# Patient Record
Sex: FEMALE | Race: WHITE | NOT HISPANIC OR LATINO | Employment: OTHER | URBAN - METROPOLITAN AREA
[De-identification: names, ages, dates, MRNs, and addresses within clinical notes are randomized per-mention and may not be internally consistent; named-entity substitution may affect disease eponyms.]

---

## 2017-01-03 ENCOUNTER — OFFICE VISIT (OUTPATIENT)
Dept: INTERNAL MEDICINE | Facility: CLINIC | Age: 82
End: 2017-01-03
Payer: MEDICARE

## 2017-01-03 VITALS
SYSTOLIC BLOOD PRESSURE: 142 MMHG | TEMPERATURE: 99 F | BODY MASS INDEX: 23.91 KG/M2 | HEART RATE: 74 BPM | DIASTOLIC BLOOD PRESSURE: 80 MMHG | HEIGHT: 63 IN | WEIGHT: 134.94 LBS

## 2017-01-03 DIAGNOSIS — N64.59 INVERTED NIPPLE: Chronic | ICD-10-CM

## 2017-01-03 DIAGNOSIS — Z79.890 POSTMENOPAUSAL HORMONE REPLACEMENT THERAPY: ICD-10-CM

## 2017-01-03 DIAGNOSIS — G47.9 SLEEP DISTURBANCE: ICD-10-CM

## 2017-01-03 DIAGNOSIS — M81.0 OSTEOPOROSIS: ICD-10-CM

## 2017-01-03 DIAGNOSIS — N64.4 BREAST TENDERNESS: Primary | ICD-10-CM

## 2017-01-03 DIAGNOSIS — Z12.31 ENCOUNTER FOR SCREENING MAMMOGRAM FOR BREAST CANCER: ICD-10-CM

## 2017-01-03 PROCEDURE — 99999 PR PBB SHADOW E&M-EST. PATIENT-LVL III: CPT | Mod: PBBFAC,,, | Performed by: FAMILY MEDICINE

## 2017-01-03 PROCEDURE — 99499 UNLISTED E&M SERVICE: CPT | Mod: S$GLB,,, | Performed by: FAMILY MEDICINE

## 2017-01-03 PROCEDURE — 1160F RVW MEDS BY RX/DR IN RCRD: CPT | Mod: S$GLB,,, | Performed by: FAMILY MEDICINE

## 2017-01-03 PROCEDURE — 1126F AMNT PAIN NOTED NONE PRSNT: CPT | Mod: S$GLB,,, | Performed by: FAMILY MEDICINE

## 2017-01-03 PROCEDURE — 1157F ADVNC CARE PLAN IN RCRD: CPT | Mod: S$GLB,,, | Performed by: FAMILY MEDICINE

## 2017-01-03 PROCEDURE — 99214 OFFICE O/P EST MOD 30 MIN: CPT | Mod: S$GLB,,, | Performed by: FAMILY MEDICINE

## 2017-01-03 PROCEDURE — 3079F DIAST BP 80-89 MM HG: CPT | Mod: S$GLB,,, | Performed by: FAMILY MEDICINE

## 2017-01-03 PROCEDURE — 1159F MED LIST DOCD IN RCRD: CPT | Mod: S$GLB,,, | Performed by: FAMILY MEDICINE

## 2017-01-03 PROCEDURE — 3077F SYST BP >= 140 MM HG: CPT | Mod: S$GLB,,, | Performed by: FAMILY MEDICINE

## 2017-01-03 NOTE — PROGRESS NOTES
Subjective:      Patient ID: Soren Gasca is a 83 y.o. female.    Chief Complaint: Breast Pain and sleep issues    HPI Comments: Patient is coming in today by herself  for  A few issues.     First she wants to see about breast tenderness.  She states is been going on for about a week.  It's sore just to the touch.  She has a known history of a right inverted nipple.  She denies any change in her diet.  She denies any coughing excessively.  She takes 2 extra strength Tylenol twice a day regularly.  She is not really due for mammogram for the two-year ti until October 2017 however she is on estradiol 0.5 mg daily.  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    She does report occasionally just not sleeping well.  She reports that she'll wake up around 2 or 3 AM and sometimes can go back to sleep.  She is uncertain what this may be related to.  She is currently on Aricept at night.  She takes Xanax at nighttime at 0.25 every night.  She states she hasn't missed any doses.  She does report that she's overly anxious at good bit lately.  She doesn't like getting older at this time.    She is needing a bone density.  She's willing to this the same time she does her mammogram.    She does still report some sinus congestion as well.        Lab Results   Component Value Date    WBC 8.77 11/02/2016    HGB 12.1 11/02/2016    HCT 37.1 11/02/2016     11/02/2016    CHOL 221 (H) 02/16/2016    TRIG 179 (H) 02/16/2016    HDL 65 02/16/2016    ALT 14 11/02/2016    AST 27 11/02/2016     11/02/2016    K 5.1 11/02/2016     11/02/2016    CREATININE 0.8 11/02/2016    BUN 18 11/02/2016    CO2 29 11/02/2016    TSH 1.086 11/02/2016    INR 1.0 12/03/2009    HGBA1C 5.9 11/02/2016       Review of Systems   Constitutional: Negative for activity change, appetite change, fatigue and unexpected weight change.    Respiratory: Negative for cough and shortness of breath.    Cardiovascular: Negative for chest pain and palpitations.   Gastrointestinal: Negative for abdominal distention and abdominal pain.   Neurological: Negative for weakness and light-headedness.   Psychiatric/Behavioral: Positive for confusion and sleep disturbance. The patient is nervous/anxious.      Objective:     Physical Exam   Constitutional: She appears well-developed and well-nourished.   HENT:   Head: Normocephalic and atraumatic.   Right Ear: Tympanic membrane normal.   Left Ear: Tympanic membrane normal.   Mouth/Throat: Oropharynx is clear and moist.   Eyes: Conjunctivae and EOM are normal.   Neck: Normal range of motion. Neck supple.   Cardiovascular: Normal rate and regular rhythm.    Pulmonary/Chest: Effort normal and breath sounds normal. Right breast exhibits inverted nipple and tenderness. Right breast exhibits no mass, no nipple discharge and no skin change. Left breast exhibits tenderness. Left breast exhibits no inverted nipple, no mass, no nipple discharge and no skin change.       Psychiatric: She has a normal mood and affect. Her behavior is normal.   Nursing note and vitals reviewed.    Assessment:     1. Breast tenderness    2. Encounter for screening mammogram for breast cancer    3. Inverted nipple    4. Postmenopausal hormone replacement therapy    5. Osteoporosis    6. Sleep disturbance      Plan:   Soren was seen today for breast pain and sleep issues.    Diagnoses and all orders for this visit:    Breast tenderness  Comments:  bilateral, no masses palpated on estrogen, schedule for regular mammogram.   Orders:  -     Mammo Digital Screening Bilat with CAD; Future    Encounter for screening mammogram for breast cancer  -     Mammo Digital Screening Bilat with CAD; Future    Inverted nipple  Comments:  chronic on righ breast.   Orders:  -     Mammo Digital Screening Bilat with CAD; Future    Postmenopausal hormone replacement  therapy  Comments:  doesn't want to stop meds currently. on estradiol. denies taking additional doses.   Orders:  -     Mammo Digital Screening Bilat with CAD; Future    Osteoporosis  Comments:  needing dexa will schedule on same day as mammo  Orders:  -     DXA Bone Density Spine And Hip_Axial Skeleton; Future    Sleep disturbance  Comments:  has xanax using nightly. some concerns may be related to aricept as well.                Return if symptoms worsen or fail to improve.

## 2017-01-03 NOTE — MR AVS SNAPSHOT
[facility]  [address] [place] [address]  Phone: [phone]  Fax: [phone]                  Soren Gasca   1/3/2017 9:00 AM   Office Visit    Description:  Female : 1933   Provider:  Monica Jones MD   Department[facility]  [facility]           Reason for Visit     Breast Pain           Diagnoses this Visit        Comments    Breast tenderness    -  Primary bilateral, no masses palpated on estrogen, schedule for regular mammogram.     Encounter for screening mammogram for breast cancer         Inverted nipple     chronic on left breast.     Postmenopausal hormone replacement therapy     doesn't want to stop meds currently. on estradiol. denies taking additional doses.     Osteoporosis     needing dexa will schedule on same day as mammo    Sleep disturbance     has xanax using nightly.            To Do List           Future Appointments        Provider Department Dept Phone    2017 2:30 PM [facility] MAMMO1-SCR [facility] [phone]    2017 3:00 PM [facility] BMD1 [facility] [phone]    2017 1:00 PM HRA, YESIKA [facility] [phone]    5/3/2017 10:00 AM Monica Jones MD [facility] [phone]      Goals (5 Years of Data)     None      Follow-Up and Disposition     Return if symptoms worsen or fail to improve.      [facility]     [facility] Nurse Care Line -  Assistance  Registered nurses in the [facility] provide clinical advisement, health education, appointment booking, and other advisory services.  Call for this free service at [phone].             Medications           Message regarding Medications     Verify the changes and/or additions to your medication regime listed below are the same as discussed with your clinician today.  If any of these changes or additions are incorrect, please notify your healthcare provider.    "          Verify that the below list of medications is an accurate representation of the medications you are currently taking.  If none reported, the list may be blank. If incorrect, please contact your healthcare provider. Carry this list with you in case of emergency.           Current Medications     acetaminophen (TYLENOL ARTHRITIS PAIN) 650 MG TbSR Take 650 mg by mouth every 8 (eight) hours.    alprazolam (XANAX) 0.25 MG tablet Take 1 tablet (0.25 mg total) by mouth nightly as needed.    cyanocobalamin (VITAMIN B-12) 500 MCG tablet Take 500 mcg by mouth once daily.    donepezil (ARICEPT) 10 MG tablet Take 1 tablet (10 mg total) by mouth every evening.    doxycycline (MONODOX) 50 MG Cap Take once daily with food    estradiol (ESTRACE) 0.5 MG tablet Take 1 tablet (0.5 mg total) by mouth once daily.    furosemide (LASIX) 20 MG tablet TAKE ONE TABLET BY MOUTH ONE TIME DAILY    glucosamine-chondroitin 500-400 mg tablet Take 1 tablet by mouth 3 (three) times daily.    levocetirizine (XYZAL) 5 MG tablet Take 1 tablet each morning.    loratadine (CLARITIN) 10 mg tablet Take 10 mg by mouth once daily.    meclizine (ANTIVERT) 25 mg tablet Take 1 tablet (25 mg total) by mouth 3 (three) times daily as needed.    metoprolol tartrate (LOPRESSOR) 25 MG tablet Take 1 tablet (25 mg total) by mouth 2 (two) times daily.    pantoprazole (PROTONIX) 40 MG tablet TAKE ONE TABLET BY MOUTH ONE TIME DAILY    triamcinolone acetonide 0.1% (KENALOG) 0.1 % cream Apply topically 2 (two) times daily.    vitamin D 1000 units Tab Take 185 mg by mouth once daily.           Clinical Reference Information           Vital Signs - Last Recorded  Most recent update: 1/3/2017  9:12 AM by Morgan Albert MA    BP Pulse Temp Ht Wt BMI    (!) 142/80 74 98.5 °F (36.9 °C) 5' 3" (1.6 m) 61.2 kg (134 lb 14.7 oz) 23.9 kg/m2      Blood Pressure          Most Recent Value    BP  (!)  142/80      Allergies as of 1/3/2017     Amlodipine    Aspirin    Bactrim  " [Sulfamethoxazole-trimethoprim]    Sulfa (Sulfonamide Antibiotics)    Trimethoprim      Immunizations Administered on Date of Encounter - 1/3/2017     None      Orders Placed During Today's Visit     Future Labs/Procedures Expected by Expires    DXA Bone Density Spine And Hip_Axial Skeleton  1/3/2017 4/3/2017    Mammo Digital Screening Bilat with CAD  1/3/2017 3/6/2018      [facility] Sign-Up     Activating your [facility] account is as easy as 1-2-3!     1) Visit [email or web address], select Sign Up Now, enter this activation code and your date of birth, then select Next.  [email or web address]  Expires: 2/17/2017  9:29 AM      2) Create a username and password to use when you visit [facility] in the future and select a security question in case you lose your password and select Next.    3) Enter your e-mail address and click Sign Up!    Additional Information  If you have questions, please e-mail [email or web address] or call [phone] to talk to our [facility] staff. Remember, [facility] is NOT to be used for urgent needs. For medical emergencies, dial 911.

## 2017-01-04 ENCOUNTER — HOSPITAL ENCOUNTER (OUTPATIENT)
Dept: RADIOLOGY | Facility: HOSPITAL | Age: 82
Discharge: HOME | End: 2017-01-04
Attending: FAMILY MEDICINE
Payer: MEDICARE

## 2017-01-04 DIAGNOSIS — Z12.31 ENCOUNTER FOR SCREENING MAMMOGRAM FOR BREAST CANCER: ICD-10-CM

## 2017-01-04 DIAGNOSIS — N64.4 BREAST TENDERNESS: ICD-10-CM

## 2017-01-04 DIAGNOSIS — N64.59 INVERTED NIPPLE: Chronic | ICD-10-CM

## 2017-01-04 DIAGNOSIS — Z79.890 POSTMENOPAUSAL HORMONE REPLACEMENT THERAPY: ICD-10-CM

## 2017-01-04 PROCEDURE — 77066 DX MAMMO INCL CAD BI: CPT | Mod: 26,,, | Performed by: RADIOLOGY

## 2017-01-04 PROCEDURE — 77062 BREAST TOMOSYNTHESIS BI: CPT | Mod: 26,,, | Performed by: RADIOLOGY

## 2017-01-04 PROCEDURE — 77062 BREAST TOMOSYNTHESIS BI: CPT | Mod: TC

## 2017-01-05 ENCOUNTER — DOCUMENTATION ONLY (OUTPATIENT)
Dept: RADIOLOGY | Facility: HOSPITAL | Age: 82
End: 2017-01-05

## 2017-01-05 ENCOUNTER — TELEPHONE (OUTPATIENT)
Dept: INTERNAL MEDICINE | Facility: CLINIC | Age: 82
End: 2017-01-05

## 2017-01-05 NOTE — PROGRESS NOTES
Breast Care Management Follow-Up:    Date of Mammogram:01/04/17    Mammogram Reason:Pain in the right breast    Mammogram Results:No abnormality seen.      Referrals/Recommendations:If there is a persistent palpable abnormality, an u/s is rec. Annual mammo rec.        Patient Status:01/05/17 Results and rec given to pt by Dr. Jones's office. Results letter and report mailed to pt.

## 2017-01-05 NOTE — TELEPHONE ENCOUNTER
----- Message from Seema Patelite sent at 1/5/2017  9:43 AM CST -----  Contact: Pt   Pt called and stated she was returning a call. She can be reached at [phone] ([email or web address]).    Thanks,  TF

## 2017-01-15 ENCOUNTER — TELEPHONE (OUTPATIENT)
Dept: INTERNAL MEDICINE | Facility: CLINIC | Age: 82
End: 2017-01-15

## 2017-01-15 RX ORDER — ALPRAZOLAM 0.25 MG/1
0.25 TABLET ORAL NIGHTLY PRN
Qty: 30 TABLET | Refills: 1 | Status: SHIPPED | OUTPATIENT
Start: 2017-01-15 | End: 2017-03-27 | Stop reason: SDUPTHER

## 2017-01-15 NOTE — TELEPHONE ENCOUNTER
Bone density shows osteopenia. Continue to obtain 1200mg of calcium through diet, along with about 1000 IU of vitamin D a day. Repeat again in 3-5 years bone density study.

## 2017-01-24 RX ORDER — METOPROLOL TARTRATE 25 MG/1
25 TABLET, FILM COATED ORAL 2 TIMES DAILY
Qty: 180 TABLET | Refills: 3 | Status: SHIPPED | OUTPATIENT
Start: 2017-01-24 | End: 2017-12-11 | Stop reason: SDUPTHER

## 2017-01-24 NOTE — TELEPHONE ENCOUNTER
----- Message from Heide Mcneal sent at 1/24/2017  3:48 PM CST -----  Contact: Librado with [facility] [employer]  [phone]  Pt needs auth for a 90 day supply on Metoprolol

## 2017-03-27 RX ORDER — ALPRAZOLAM 0.25 MG/1
0.25 TABLET ORAL NIGHTLY PRN
Qty: 30 TABLET | Refills: 1 | Status: SHIPPED | OUTPATIENT
Start: 2017-03-27 | End: 2017-07-03 | Stop reason: SDUPTHER

## 2017-03-29 ENCOUNTER — TELEPHONE (OUTPATIENT)
Dept: INTERNAL MEDICINE | Facility: CLINIC | Age: 82
End: 2017-03-29

## 2017-03-29 NOTE — TELEPHONE ENCOUNTER
----- Message from Seema Yepez sent at 3/29/2017 10:37 AM CDT -----  Contact: Pt   Pt called and stated she needed to speak to the nurse. She stated she needs to know if her alprazolam medication has been called in to the pharmacy.  She can be reached at [phone].    Thanks,  TF

## 2017-04-13 RX ORDER — MECLIZINE HYDROCHLORIDE 25 MG/1
25 TABLET ORAL 3 TIMES DAILY PRN
Qty: 90 TABLET | Refills: 3 | Status: SHIPPED | OUTPATIENT
Start: 2017-04-13 | End: 2017-12-11 | Stop reason: SDUPTHER

## 2017-04-19 ENCOUNTER — PATIENT OUTREACH (OUTPATIENT)
Dept: ADMINISTRATIVE | Facility: HOSPITAL | Age: 82
End: 2017-04-19

## 2017-04-19 DIAGNOSIS — I10 ESSENTIAL HYPERTENSION: Primary | Chronic | ICD-10-CM

## 2017-04-19 NOTE — PROGRESS NOTES
CHART AUDIT COMPLETED. LABS ENTERED PER WOG. LETTER MAILED TO INFORM PATIENT OF OVERDUE HEALTH MAINTENANCE.

## 2017-04-19 NOTE — LETTER
April 19, 2017    Soren Gasca  [address]  [place] [address]             [facility]  [address]  [facility] [address]  Phone: [phone] Dear Mrs. Gasca:        [facility] is committed to your overall health.  To help you get the most out of each of your visits, we will review your information to make sure you are up to date on all of your recommended tests and/or procedures.      Monica Jones MD has found that you may be due for   Health Maintenance Due   Topic    Lipid Panel         If you have had any of the above done at another facility, please bring the records or information with you so that your record at [facility] will be complete.    If you are currently taking medication, please bring it with you to your appointment for review.    We will be happy to assist you with scheduling any necessary appointments or you may contact the [facility] appointment desk at [phone] to schedule at your convenience.     Thank you for choosing [facility] for your healthcare needs,    Tammy WADE LPN  Care Coordination Department  [facility]  [phone]

## 2017-05-02 ENCOUNTER — LAB VISIT (OUTPATIENT)
Dept: LAB | Facility: HOSPITAL | Age: 82
End: 2017-05-02
Attending: FAMILY MEDICINE
Payer: MEDICARE

## 2017-05-02 ENCOUNTER — OFFICE VISIT (OUTPATIENT)
Dept: INTERNAL MEDICINE | Facility: CLINIC | Age: 82
End: 2017-05-02
Payer: MEDICARE

## 2017-05-02 VITALS
WEIGHT: 133.19 LBS | HEIGHT: 63 IN | BODY MASS INDEX: 23.6 KG/M2 | DIASTOLIC BLOOD PRESSURE: 60 MMHG | HEART RATE: 80 BPM | SYSTOLIC BLOOD PRESSURE: 120 MMHG | TEMPERATURE: 98 F

## 2017-05-02 DIAGNOSIS — E55.9 VITAMIN D DEFICIENCY DISEASE: ICD-10-CM

## 2017-05-02 DIAGNOSIS — G31.84 MILD COGNITIVE IMPAIRMENT WITH MEMORY LOSS: Chronic | ICD-10-CM

## 2017-05-02 DIAGNOSIS — I10 ESSENTIAL HYPERTENSION: Chronic | ICD-10-CM

## 2017-05-02 DIAGNOSIS — I70.0 CALCIFICATION OF AORTA: Chronic | ICD-10-CM

## 2017-05-02 DIAGNOSIS — R73.9 HYPERGLYCEMIA: ICD-10-CM

## 2017-05-02 DIAGNOSIS — E78.5 HYPERLIPIDEMIA, UNSPECIFIED HYPERLIPIDEMIA TYPE: Chronic | ICD-10-CM

## 2017-05-02 DIAGNOSIS — Z00.00 ROUTINE GENERAL MEDICAL EXAMINATION AT A HEALTH CARE FACILITY: ICD-10-CM

## 2017-05-02 DIAGNOSIS — D69.6 THROMBOCYTOPENIA: Chronic | ICD-10-CM

## 2017-05-02 DIAGNOSIS — Z90.81 H/O SPLENECTOMY: Chronic | ICD-10-CM

## 2017-05-02 DIAGNOSIS — Z00.00 ROUTINE GENERAL MEDICAL EXAMINATION AT A HEALTH CARE FACILITY: Primary | ICD-10-CM

## 2017-05-02 LAB
25(OH)D3+25(OH)D2 SERPL-MCNC: 89 NG/ML
ALBUMIN SERPL BCP-MCNC: 3.7 G/DL
ALP SERPL-CCNC: 57 U/L
ALT SERPL W/O P-5'-P-CCNC: 9 U/L
ANION GAP SERPL CALC-SCNC: 12 MMOL/L
AST SERPL-CCNC: 17 U/L
BASOPHILS # BLD AUTO: 0.03 K/UL
BASOPHILS NFR BLD: 0.4 %
BILIRUB SERPL-MCNC: 0.3 MG/DL
BUN SERPL-MCNC: 21 MG/DL
CALCIUM SERPL-MCNC: 9.3 MG/DL
CHLORIDE SERPL-SCNC: 104 MMOL/L
CHOLEST/HDLC SERPL: 3.6 {RATIO}
CO2 SERPL-SCNC: 24 MMOL/L
CREAT SERPL-MCNC: 0.8 MG/DL
DIFFERENTIAL METHOD: ABNORMAL
EOSINOPHIL # BLD AUTO: 0.1 K/UL
EOSINOPHIL NFR BLD: 1.1 %
ERYTHROCYTE [DISTWIDTH] IN BLOOD BY AUTOMATED COUNT: 15.1 %
EST. GFR  (AFRICAN AMERICAN): >60 ML/MIN/1.73 M^2
EST. GFR  (NON AFRICAN AMERICAN): >60 ML/MIN/1.73 M^2
GLUCOSE SERPL-MCNC: 107 MG/DL
HCT VFR BLD AUTO: 34.5 %
HDL/CHOLESTEROL RATIO: 27.6 %
HDLC SERPL-MCNC: 203 MG/DL
HDLC SERPL-MCNC: 56 MG/DL
HGB BLD-MCNC: 11.6 G/DL
LDLC SERPL CALC-MCNC: 102.2 MG/DL
LYMPHOCYTES # BLD AUTO: 4 K/UL
LYMPHOCYTES NFR BLD: 48.9 %
MCH RBC QN AUTO: 31.7 PG
MCHC RBC AUTO-ENTMCNC: 33.6 %
MCV RBC AUTO: 94 FL
MONOCYTES # BLD AUTO: 0.6 K/UL
MONOCYTES NFR BLD: 7.6 %
NEUTROPHILS # BLD AUTO: 3.4 K/UL
NEUTROPHILS NFR BLD: 41.9 %
NONHDLC SERPL-MCNC: 147 MG/DL
PLATELET # BLD AUTO: 295 K/UL
PMV BLD AUTO: 11.1 FL
POTASSIUM SERPL-SCNC: 3.7 MMOL/L
PROT SERPL-MCNC: 7.3 G/DL
RBC # BLD AUTO: 3.66 M/UL
SODIUM SERPL-SCNC: 140 MMOL/L
T4 FREE SERPL-MCNC: 0.91 NG/DL
TRIGL SERPL-MCNC: 224 MG/DL
TSH SERPL DL<=0.005 MIU/L-ACNC: 1.49 UIU/ML
WBC # BLD AUTO: 8.18 K/UL

## 2017-05-02 PROCEDURE — 84443 ASSAY THYROID STIM HORMONE: CPT

## 2017-05-02 PROCEDURE — 99499 UNLISTED E&M SERVICE: CPT | Mod: ,,, | Performed by: FAMILY MEDICINE

## 2017-05-02 PROCEDURE — 84439 ASSAY OF FREE THYROXINE: CPT

## 2017-05-02 PROCEDURE — 99999 PR PBB SHADOW E&M-EST. PATIENT-LVL III: CPT | Mod: PBBFAC,,, | Performed by: FAMILY MEDICINE

## 2017-05-02 PROCEDURE — 85025 COMPLETE CBC W/AUTO DIFF WBC: CPT

## 2017-05-02 PROCEDURE — 3074F SYST BP LT 130 MM HG: CPT | Mod: S$GLB,,, | Performed by: FAMILY MEDICINE

## 2017-05-02 PROCEDURE — 36415 COLL VENOUS BLD VENIPUNCTURE: CPT | Mod: PO

## 2017-05-02 PROCEDURE — 80061 LIPID PANEL: CPT

## 2017-05-02 PROCEDURE — 3078F DIAST BP <80 MM HG: CPT | Mod: S$GLB,,, | Performed by: FAMILY MEDICINE

## 2017-05-02 PROCEDURE — 83036 HEMOGLOBIN GLYCOSYLATED A1C: CPT

## 2017-05-02 PROCEDURE — 82306 VITAMIN D 25 HYDROXY: CPT

## 2017-05-02 PROCEDURE — 80053 COMPREHEN METABOLIC PANEL: CPT

## 2017-05-02 PROCEDURE — 99499 UNLISTED E&M SERVICE: CPT | Mod: S$GLB,,, | Performed by: FAMILY MEDICINE

## 2017-05-02 PROCEDURE — 99397 PER PM REEVAL EST PAT 65+ YR: CPT | Mod: S$GLB,,, | Performed by: FAMILY MEDICINE

## 2017-05-02 NOTE — PROGRESS NOTES
Subjective:      Patient ID: Soren Gasca is a 83 y.o. female.    Chief Complaint: Follow-up (6m) and Annual Exam    HPI Comments: Patient is coming in today with her daughter  for followup of multiple issues and prevention exam    She reports now that her energy levels are good and she's doing well.  She still has complaints of postnasal drip ears itching and nasal lesions with some chest congestion off and on but otherwise doing well.  She seems to complain about this each and every time.  She's tried Claritin and Zyrtec and over-the-counter medicines.  She has prescribed to her leave a search was seen but she doesn't report taking it.  Her graft she denies any fatigue at this time.  She does have mild cognitive decline for which she's on Aricept.  She saw neurology last April and is due again for repeat.  She is not had any balance or fall issues that they're aware of.  They stated time she does get off balance but hasn't fallen yet.  Her oldest granddaughter lives with her as well.      She does use Xanax to help her sleep at night. Stable at this time    Blood pressures very well controlled today no problems with her medication at this time.      She does have a history of hypothyroidism for which were needing to repeat some blood work today.  She also has a history of low platelets which will be repeated as well today.            Lab Results   Component Value Date    WBC 8.77 11/02/2016    HGB 12.1 11/02/2016    HCT 37.1 11/02/2016     11/02/2016    CHOL 221 (H) 02/16/2016    TRIG 179 (H) 02/16/2016    HDL 65 02/16/2016    ALT 14 11/02/2016    AST 27 11/02/2016     11/02/2016    K 5.1 11/02/2016     11/02/2016    CREATININE 0.8 11/02/2016    BUN 18 11/02/2016    CO2 29 11/02/2016    TSH 1.086 11/02/2016    INR 1.0 12/03/2009    HGBA1C 5.9 11/02/2016       Review of Systems   Constitutional: Negative for chills, fatigue and fever.   HENT: Positive for congestion and postnasal drip. Negative  for ear pain and trouble swallowing.    Eyes: Negative for pain and visual disturbance.   Respiratory: Negative for cough, shortness of breath and wheezing.    Cardiovascular: Negative for chest pain and leg swelling.   Gastrointestinal: Negative for abdominal pain, blood in stool, nausea and vomiting.   Endocrine: Negative for cold intolerance and heat intolerance.   Genitourinary: Negative for dysuria and frequency.   Musculoskeletal: Negative for joint swelling, myalgias and neck pain.   Skin: Negative for color change and rash.   Neurological: Negative for dizziness and headaches.   Psychiatric/Behavioral: Positive for confusion. Negative for behavioral problems and sleep disturbance.     Objective:     Physical Exam   Constitutional: She appears well-developed and well-nourished.   HENT:   Head: Normocephalic and atraumatic.   Right Ear: External ear normal.   Left Ear: External ear normal.   Mouth/Throat: Oropharynx is clear and moist.   Eyes: EOM are normal.   Neck: Normal range of motion. Neck supple. No thyromegaly present.   Cardiovascular: Normal rate and regular rhythm.  Exam reveals no gallop and no friction rub.    No murmur heard.  Pulmonary/Chest: Effort normal. No respiratory distress. She has no wheezes. She has no rales.   Abdominal: Soft. Bowel sounds are normal. She exhibits no distension. There is no tenderness. There is no rebound.   Musculoskeletal: Normal range of motion. She exhibits no edema.   Lymphadenopathy:     She has no cervical adenopathy.   Neurological: She is alert. No sensory deficit. Coordination and gait normal.   Skin: Skin is warm and dry. No rash noted.   Psychiatric: She has a normal mood and affect. Her speech is normal and behavior is normal. Judgment and thought content normal. Cognition and memory are impaired. She exhibits normal recent memory and normal remote memory.   Vitals reviewed.    Assessment:     1. Routine general medical examination at a [facility]  facility    2. Hyperlipidemia, unspecified hyperlipidemia type    3. Essential hypertension    4. H/O splenectomy    5. Calcification of aorta    6. Thrombocytopenia    7. Hyperglycemia    8. Vitamin D deficiency disease    9. Mild cognitive impairment with memory loss      Plan:   Soren was seen today for follow-up and annual exam.    Diagnoses and all orders for this visit:    Routine general medical examination at a [facility] facility- - labs ordered. Discussed Health Maintenance issues.     -     Lipid panel; Future  -     TSH; Future  -     T4, free; Future  -     Hemoglobin A1c; Future  -     Comprehensive metabolic panel; Future  -     CBC auto differential; Future  -     Vitamin D; Future    Hyperlipidemia, unspecified hyperlipidemia type - stable, Continue with current medications and interventions. - labs ordered.      -     Lipid panel; Future  -     TSH; Future  -     T4, free; Future  -     Hemoglobin A1c; Future  -     Comprehensive metabolic panel; Future  -     CBC auto differential; Future  -     Vitamin D; Future    Essential hypertension- stable, Continue with current medications and interventions. - labs ordered.      -     Lipid panel; Future  -     TSH; Future  -     T4, free; Future  -     Hemoglobin A1c; Future  -     Comprehensive metabolic panel; Future  -     CBC auto differential; Future  -     Vitamin D; Future    H/O splenectomy- stable, Continue with current medications and interventions. - labs ordered.      -     Lipid panel; Future  -     TSH; Future  -     T4, free; Future  -     Hemoglobin A1c; Future  -     Comprehensive metabolic panel; Future  -     CBC auto differential; Future  -     Vitamin D; Future    Calcification of aorta- stable, Continue with current medications and interventions. - labs ordered.      -     Lipid panel; Future  -     TSH; Future  -     T4, free; Future  -     Hemoglobin A1c; Future  -     Comprehensive metabolic panel; Future  -     CBC auto  differential; Future  -     Vitamin D; Future    Thrombocytopenia- stable, Continue with current medications and interventions. - labs ordered.      -     Lipid panel; Future  -     TSH; Future  -     T4, free; Future  -     Hemoglobin A1c; Future  -     Comprehensive metabolic panel; Future  -     CBC auto differential; Future  -     Vitamin D; Future    Hyperglycemia- stable, Continue with current medications and interventions. - labs ordered.  Last a1c noted to be prediabetic with a1c at 5.8    -     Lipid panel; Future  -     TSH; Future  -     T4, free; Future  -     Hemoglobin A1c; Future  -     Comprehensive metabolic panel; Future  -     CBC auto differential; Future  -     Vitamin D; Future    Vitamin D deficiency disease - stable, Continue with current medications and interventions. - labs ordered.      -     Lipid panel; Future  -     TSH; Future  -     T4, free; Future  -     Hemoglobin A1c; Future  -     Comprehensive metabolic panel; Future  -     CBC auto differential; Future  -     Vitamin D; Future    Mild cognitive impairment with memory loss- stable, Continue with current medications and interventions. Referral placed for yearly checkup     -     Ambulatory referral to Neurology            Return in about 6 months (around 11/2/2017) for chronic issues.

## 2017-05-02 NOTE — MR AVS SNAPSHOT
[facility]  [address] [place] [address]  Phone: [phone]  Fax: [phone]                  Soren Gasca   2017 2:00 PM   Office Visit    Description:  Female : 1933   Provider:  Monica Jones MD   [facility]           Reason for Visit     Follow-up     Annual Exam           Diagnoses this Visit        Comments    Routine general medical examination at a health care facility    -  Primary     Hyperlipidemia, unspecified hyperlipidemia type         Essential hypertension         H/O splenectomy         Calcification of aorta         Thrombocytopenia         Hyperglycemia         Vitamin D deficiency disease         Mild cognitive impairment with memory loss                To Do List           Future Appointments        Provider Department Dept Phone    2017 11:40 AM Bassem Powers MD [facility] - Neurology [phone]    2017 1:20 PM Monica Jones [facility] [phone]      Goals (5 Years of Data)     None      Follow-Up and Disposition     Return in about 6 months (around 2017) for chronic issues.      [facility]     [facility] Nurse Care Line -  Assistance  Unless otherwise directed by your provider, please contact [facility] On-Call, our nurse care line that is available for  assistance.     Registered nurses in the [facility] Center provide: appointment scheduling, clinical advisement, health education, and other advisory services.  Call: [phone] (toll free)               Medications           Message regarding Medications     Verify the changes and/or additions to your medication regime listed below are the same as discussed with your clinician today.  If any of these changes or additions are incorrect, please notify your healthcare provider.        STOP taking these medications     doxycycline (MONODOX) 50 MG Cap Take once daily with food     "loratadine (CLARITIN) 10 mg tablet Take 10 mg by mouth once daily.           Verify that the below list of medications is an accurate representation of the medications you are currently taking.  If none reported, the list may be blank. If incorrect, please contact your healthcare provider. Carry this list with you in case of emergency.           Current Medications     acetaminophen (TYLENOL ARTHRITIS PAIN) 650 MG TbSR Take 650 mg by mouth every 8 (eight) hours.    alprazolam (XANAX) 0.25 MG tablet Take 1 tablet (0.25 mg total) by mouth nightly as needed.    cyanocobalamin (VITAMIN B-12) 500 MCG tablet Take 500 mcg by mouth once daily.    donepezil (ARICEPT) 10 MG tablet Take 1 tablet (10 mg total) by mouth every evening.    estradiol (ESTRACE) 0.5 MG tablet Take 1 tablet (0.5 mg total) by mouth once daily.    furosemide (LASIX) 20 MG tablet TAKE ONE TABLET BY MOUTH ONE TIME DAILY    glucosamine-chondroitin 500-400 mg tablet Take 1 tablet by mouth 3 (three) times daily.    levocetirizine (XYZAL) 5 MG tablet Take 1 tablet each morning.    meclizine (ANTIVERT) 25 mg tablet Take 1 tablet (25 mg total) by mouth 3 (three) times daily as needed.    metoprolol tartrate (LOPRESSOR) 25 MG tablet Take 1 tablet (25 mg total) by mouth 2 (two) times daily.    pantoprazole (PROTONIX) 40 MG tablet TAKE ONE TABLET BY MOUTH ONE TIME DAILY    triamcinolone acetonide 0.1% (KENALOG) 0.1 % cream Apply topically 2 (two) times daily.    vitamin D 1000 units Tab Take 185 mg by mouth once daily.           Clinical Reference Information           Your Vitals Were     BP Pulse Temp Height Weight BMI    120/60 80 97.8 °F (36.6 °C) 5' 3" (1.6 m) 60.4 kg (133 lb 2.5 oz) 23.59 kg/m2      Blood Pressure          Most Recent Value    BP  120/60      Allergies as of 5/2/2017     Amlodipine    Aspirin    Bactrim  [Sulfamethoxazole-trimethoprim]    Sulfa (Sulfonamide Antibiotics)    Trimethoprim      Immunizations Administered on Date of Encounter - " 5/2/2017     None      Orders Placed During Today's Visit      Normal Orders This Visit    Ambulatory referral to Neurology     Future Labs/Procedures Expected by Expires    CBC auto differential  5/2/2017 7/1/2018    Comprehensive metabolic panel  5/2/2017 7/1/2018    Hemoglobin A1c  5/2/2017 7/1/2018    Lipid panel  5/2/2017 7/1/2018    T4, free  5/2/2017 7/1/2018    TSH  5/2/2017 7/1/2018    Vitamin D  5/2/2017 7/1/2018      [facility] Sign-Up     Activating your [facility] account is as easy as 1-2-3!     1) Visit [email or web address], select Sign Up Now, enter this activation code and your date of birth, then select Next.  [email or web address]  Expires: 6/16/2017  2:22 PM      2) Create a username and password to use when you visit [facility] in the future and select a security question in case you lose your password and select Next.    3) Enter your e-mail address and click Sign Up!    Additional Information  If you have questions, please e-mail [email or web address] or call [phone] to talk to our [facility] staff. Remember, [facility] is NOT to be used for urgent needs. For medical emergencies, dial 911.         Language Assistance Services     ATTENTION: Language assistance services are available, free of charge. Please call [phone].      ATENCIÓN: Si habla español, tiene a martínez disposición servicios gratuitos de asistencia lingüística. Llame al [phone].     [facility] Ý: N?u b?n nói Ti?ng Vi?t, có các d?ch v? h? tr? ngôn ng? mi?n phí dành cho b?n. G?i s? [phone].         [facility] complies with applicable Federal civil rights laws and does not discriminate on the basis of race, color, national origin, age, disability, or sex.

## 2017-05-03 ENCOUNTER — TELEPHONE (OUTPATIENT)
Dept: INTERNAL MEDICINE | Facility: CLINIC | Age: 82
End: 2017-05-03

## 2017-05-03 LAB
ESTIMATED AVG GLUCOSE: 120 MG/DL
HBA1C MFR BLD HPLC: 5.8 %

## 2017-05-03 NOTE — PROGRESS NOTES
Cholesterol, sugar levels, vitamin D and  kidney, liver functions, and thyroid functions within goal ranges. Mildly anemic. Please inform

## 2017-05-03 NOTE — TELEPHONE ENCOUNTER
----- Message from Deysi Burnham sent at 5/3/2017  2:15 PM CDT -----  Contact: patient  Returning your call. Please call patient @ [phone]. Thanks, lisa

## 2017-05-09 ENCOUNTER — TELEPHONE (OUTPATIENT)
Dept: INTERNAL MEDICINE | Facility: CLINIC | Age: 82
End: 2017-05-09

## 2017-05-09 RX ORDER — MUPIROCIN 20 MG/G
OINTMENT TOPICAL 3 TIMES DAILY
Qty: 30 G | Refills: 0 | Status: SHIPPED | OUTPATIENT
Start: 2017-05-09 | End: 2017-09-11 | Stop reason: SDUPTHER

## 2017-05-09 NOTE — TELEPHONE ENCOUNTER
"Pt states that she is still having issues with the "irriation in her nose". She stated that it is worse and very irritated now. She stated that you did see it when she was in the office the other day. She stated that it is very sore and then crusty like a scab. She does not know what else to do for it. It is very uncomfortable and she has run out of things to treat it with OTC. She is requesting that you send in something for this? She would like an abx?   "

## 2017-05-09 NOTE — TELEPHONE ENCOUNTER
----- Message from Ebony Mejia sent at 5/9/2017 10:28 AM CDT -----  Contact: pt   Pt states that she need some antibiotic cause her nose is irritated. ..[phone] (home)         ..  [facility] [address] [place] - [address] [facility]  [address] [place] [address]  Phone: [phone] Fax: [phone]

## 2017-05-11 ENCOUNTER — TELEPHONE (OUTPATIENT)
Dept: INTERNAL MEDICINE | Facility: CLINIC | Age: 82
End: 2017-05-11

## 2017-05-11 NOTE — TELEPHONE ENCOUNTER
----- Message from Yeyo Lerma sent at 5/11/2017 11:39 AM CDT -----  Contact: pt  She's calling in regards to a missed call, please advise, [phone] (home)

## 2017-06-14 RX ORDER — ESTRADIOL 0.5 MG/1
0.5 TABLET ORAL DAILY
Qty: 90 TABLET | Refills: 2 | Status: SHIPPED | OUTPATIENT
Start: 2017-06-14 | End: 2018-02-14 | Stop reason: SDUPTHER

## 2017-07-03 RX ORDER — ALPRAZOLAM 0.25 MG/1
0.25 TABLET ORAL NIGHTLY PRN
Qty: 30 TABLET | Refills: 1 | Status: SHIPPED | OUTPATIENT
Start: 2017-07-03 | End: 2017-09-28 | Stop reason: SDUPTHER

## 2017-08-11 RX ORDER — FUROSEMIDE 20 MG/1
TABLET ORAL
Qty: 90 TABLET | Refills: 2 | Status: SHIPPED | OUTPATIENT
Start: 2017-08-11 | End: 2018-02-14 | Stop reason: SDUPTHER

## 2017-08-11 RX ORDER — PANTOPRAZOLE SODIUM 40 MG/1
TABLET, DELAYED RELEASE ORAL
Qty: 90 TABLET | Refills: 2 | Status: SHIPPED | OUTPATIENT
Start: 2017-08-11 | End: 2017-12-11 | Stop reason: SDUPTHER

## 2017-08-13 DIAGNOSIS — L71.0 PERIORAL DERMATITIS: ICD-10-CM

## 2017-08-14 RX ORDER — LEVOCETIRIZINE DIHYDROCHLORIDE 5 MG/1
TABLET, FILM COATED ORAL
Qty: 90 TABLET | Refills: 1 | Status: SHIPPED | OUTPATIENT
Start: 2017-08-14 | End: 2017-12-11

## 2017-08-15 ENCOUNTER — OFFICE VISIT (OUTPATIENT)
Dept: NEUROLOGY | Facility: CLINIC | Age: 82
End: 2017-08-15
Payer: MEDICARE

## 2017-08-15 VITALS
DIASTOLIC BLOOD PRESSURE: 78 MMHG | HEART RATE: 70 BPM | SYSTOLIC BLOOD PRESSURE: 156 MMHG | WEIGHT: 131.63 LBS | HEIGHT: 63 IN | BODY MASS INDEX: 23.32 KG/M2

## 2017-08-15 DIAGNOSIS — G31.84 MILD COGNITIVE IMPAIRMENT WITH MEMORY LOSS: Primary | Chronic | ICD-10-CM

## 2017-08-15 PROCEDURE — 3078F DIAST BP <80 MM HG: CPT | Mod: S$GLB,,, | Performed by: PSYCHIATRY & NEUROLOGY

## 2017-08-15 PROCEDURE — 1159F MED LIST DOCD IN RCRD: CPT | Mod: S$GLB,,, | Performed by: PSYCHIATRY & NEUROLOGY

## 2017-08-15 PROCEDURE — 99999 PR PBB SHADOW E&M-EST. PATIENT-LVL III: CPT | Mod: PBBFAC,,, | Performed by: PSYCHIATRY & NEUROLOGY

## 2017-08-15 PROCEDURE — 1126F AMNT PAIN NOTED NONE PRSNT: CPT | Mod: S$GLB,,, | Performed by: PSYCHIATRY & NEUROLOGY

## 2017-08-15 PROCEDURE — 99213 OFFICE O/P EST LOW 20 MIN: CPT | Mod: S$GLB,,, | Performed by: PSYCHIATRY & NEUROLOGY

## 2017-08-15 PROCEDURE — 3077F SYST BP >= 140 MM HG: CPT | Mod: S$GLB,,, | Performed by: PSYCHIATRY & NEUROLOGY

## 2017-08-15 PROCEDURE — 3008F BODY MASS INDEX DOCD: CPT | Mod: S$GLB,,, | Performed by: PSYCHIATRY & NEUROLOGY

## 2017-08-15 RX ORDER — DONEPEZIL HYDROCHLORIDE 10 MG/1
10 TABLET, FILM COATED ORAL NIGHTLY
Qty: 30 TABLET | Refills: 11 | Status: SHIPPED | OUTPATIENT
Start: 2017-08-15 | End: 2017-10-03 | Stop reason: SDUPTHER

## 2017-08-15 NOTE — LETTER
August 15, 2017      Monica Jones MD  [address]  [address]  [place] [address]           [facility]  [address]  [place] [address]  Phone: [phone]  Fax: [phone]          Patient: Soren Gasca   MR Number: [id number]   YOB: 1933   Date of Visit: 8/15/2017       Dear Dr. Monica Jones:    Thank you for referring Soren Gasca to me for evaluation. Attached you will find relevant portions of my assessment and plan of care.    If you have questions, please do not hesitate to call me. I look forward to following Soren Gasca along with you.    Sincerely,    Bassem Powers MD    Enclosure  CC:  No Recipients    If you would like to receive this communication electronically, please contact [email or web address] or [phone] to request more information on [employer] access.    For providers and/or their staff who would like to refer a patient to [facility], please contact us through our one-stop-shop provider referral line, [facility], at [phone].    If you feel you have received this communication in error or would no longer like to receive these types of communications, please e-mail [email or web address]

## 2017-08-15 NOTE — PROGRESS NOTES
This is an 83-year-old right-handed patient who is in the office with her daughter for follow-up of evaluation of her memory difficulties.  The patient continues to have difficulty with recent memory but it is difficult for the family to determine if this is actually worse than it has been in the past.  The patient continues to have difficulty recalling events that occur from 1 day to the next and even within the conversation.  According to the daughter, the patient frequently refers to others to help her with her memory issues.  She does continue however to be very independent in her home living.  She is independent for instrument all activities of daily living.  The patient is able to cook simple foods without incident.  She is independent for bathing, dressing, and hygiene.  However, the patient's daughter is somewhat concerned that the patient may not be taking her medications as directed.    The patient denies to me any headache, dizziness, nausea, or vomiting.  She denies any noticed weakness, awkwardness or paralysis of the extremities.  She denies any difficulty with dysphagia.  She denies to me any dizziness or vertigo.      ROS:  GENERAL: No fever, chills, fatigability or weight loss.  SKIN: No rashes, itching or changes in color or texture of skin.  HEAD: No headaches or recent head trauma.  EYES: Visual acuity fine. No photophobia, ocular pain or diplopia.  EARS: Denies ear pain, discharge or vertigo.  NOSE: No loss of smell, no epistaxis or postnasal drip.  MOUTH & THROAT: No hoarseness or change in voice. No excessive gum bleeding.  NODES: Denies swollen glands.  CHEST: Denies BETANCUR, cyanosis, wheezing, cough and sputum production.  CARDIOVASCULAR: Denies chest pain, PND, orthopnea or reduced exercise tolerance.  ABDOMEN: Appetite fine. No weight loss. Denies diarrhea, abdominal pain, hematemesis or blood in stool.  URINARY: No flank pain, dysuria or hematuria.  PERIPHERAL VASCULAR: No claudication or  cyanosis.  MUSCULOSKELETAL: No joint stiffness or swelling. Denies back pain.  NEUROLOGIC: No history of seizures, paralysis, alteration of gait or coordination.    The patient's past history, family history, and social history are reviewed with the patient and her daughter.  No changes are made at this time.    PE:   VITAL SIGNS: Blood pressure 156/78, pulse 70, weight 59.7 kg, height 5 foot 3 inches, BMI 23.31  APPEARANCE: Well nourished, well developed, in no acute distress.  The patient is exceptionally well dressed and outgoing and friendly.  HEAD: Normocephalic, atraumatic.  EYES: PERRL. EOMI.  Non-icteric sclerae.    EARS: TM's intact. Light reflex normal. No retraction or perforation.    NOSE: Mucosa pink. Airway clear.  MOUTH & THROAT: No tonsillar enlargement. No pharyngeal erythema or exudate. No stridor.  NECK: Supple. No bruits.  CHEST: Lungs clear to auscultation.  CARDIOVASCULAR: Regular rhythm without significant murmurs.  ABDOMEN: Bowel sounds normal. Not distended.   MUSCULOSKELETAL:  No bony deformity seen.  Muscle tone and muscle mass are normal in both upper and both lower extremities.  NEUROLOGIC:   Mental Status:  The patient is well oriented to person, as well as place and situation.  She seemed to recognize the examiner from her previous visit.  She is able to follow 2 and three-step commands consistently.  She was not able to recall 3 unrelated words at 3 minutes for 5 minutes.  The patient is attentive to the environment and cooperative for the exam.  Cranial Nerves: II-XII grossly intact. Fundoscopic exam is normal.  No hemorrhage, exudate or papilledema is present. The extraocular muscles are intact in the cardinal directions of gaze.  No ptosis is present. Facial features are symmetrical.  Speech is normal in fluency, diction, and phrasing.  Tongue protrudes in the midline.    Gait and Station:  Romberg is negative.  Good alternate armswing with normal gait.  Motor:  No downdrift of  either arm when held at shoulder level.  Manual muscle testing of proximal and distal muscles of both upper and lower extremities is normal.   Sensory:  Intact both upper and lower extremities to pin prick, touch, and vibration.  Cerebellar:  Finger to nose done well.  Alternating movements intact.  No involuntary movements or tremor seen.  Reflexes:  Stretch reflexes are 2+ both upper and lower extremities.  Plantar stimulation is flexor bilaterally and no pathological reflexes are seen     ASSESSMENT:  1.  Mild cognitive impairment with memory loss, without evidence of significant progression on exam    RECOMMENDATIONS:  1.  Continue Aricept 10 mg at bedtime  2.  Return to neurology in 6 months.       This note is generated with speech recognition software and is subject to transcription error and sound alike phrases that may be missed by proofreading.

## 2017-09-11 ENCOUNTER — LAB VISIT (OUTPATIENT)
Dept: LAB | Facility: HOSPITAL | Age: 82
End: 2017-09-11
Attending: FAMILY MEDICINE
Payer: MEDICARE

## 2017-09-11 ENCOUNTER — OFFICE VISIT (OUTPATIENT)
Dept: INTERNAL MEDICINE | Facility: CLINIC | Age: 82
End: 2017-09-11
Payer: MEDICARE

## 2017-09-11 VITALS
SYSTOLIC BLOOD PRESSURE: 132 MMHG | DIASTOLIC BLOOD PRESSURE: 80 MMHG | TEMPERATURE: 97 F | WEIGHT: 132.25 LBS | HEART RATE: 78 BPM | HEIGHT: 63 IN | BODY MASS INDEX: 23.43 KG/M2

## 2017-09-11 DIAGNOSIS — R09.89 ERYTHEMATOUS NASAL MUCOSA: ICD-10-CM

## 2017-09-11 DIAGNOSIS — D69.6 THROMBOCYTOPENIA: ICD-10-CM

## 2017-09-11 DIAGNOSIS — D64.9 ANEMIA, UNSPECIFIED TYPE: ICD-10-CM

## 2017-09-11 DIAGNOSIS — E53.8 VITAMIN B12 DEFICIENCY DISEASE: ICD-10-CM

## 2017-09-11 DIAGNOSIS — R25.2 LEG CRAMPS: Primary | ICD-10-CM

## 2017-09-11 DIAGNOSIS — R25.2 LEG CRAMPS: ICD-10-CM

## 2017-09-11 LAB
ANION GAP SERPL CALC-SCNC: 10 MMOL/L
BASOPHILS # BLD AUTO: 0.03 K/UL
BASOPHILS NFR BLD: 0.3 %
BUN SERPL-MCNC: 14 MG/DL
CALCIUM SERPL-MCNC: 9.1 MG/DL
CHLORIDE SERPL-SCNC: 104 MMOL/L
CO2 SERPL-SCNC: 28 MMOL/L
CREAT SERPL-MCNC: 0.8 MG/DL
DIFFERENTIAL METHOD: ABNORMAL
EOSINOPHIL # BLD AUTO: 0.1 K/UL
EOSINOPHIL NFR BLD: 1.5 %
ERYTHROCYTE [DISTWIDTH] IN BLOOD BY AUTOMATED COUNT: 14.4 %
EST. GFR  (AFRICAN AMERICAN): >60 ML/MIN/1.73 M^2
EST. GFR  (NON AFRICAN AMERICAN): >60 ML/MIN/1.73 M^2
GLUCOSE SERPL-MCNC: 87 MG/DL
HCT VFR BLD AUTO: 34.1 %
HGB BLD-MCNC: 11.4 G/DL
IRON SERPL-MCNC: 56 UG/DL
LYMPHOCYTES # BLD AUTO: 4.7 K/UL
LYMPHOCYTES NFR BLD: 53 %
MAGNESIUM SERPL-MCNC: 1.9 MG/DL
MCH RBC QN AUTO: 32.3 PG
MCHC RBC AUTO-ENTMCNC: 33.4 G/DL
MCV RBC AUTO: 97 FL
MONOCYTES # BLD AUTO: 0.7 K/UL
MONOCYTES NFR BLD: 7.5 %
NEUTROPHILS # BLD AUTO: 3.3 K/UL
NEUTROPHILS NFR BLD: 37.6 %
PLATELET # BLD AUTO: 290 K/UL
PMV BLD AUTO: 11.1 FL
POTASSIUM SERPL-SCNC: 4.1 MMOL/L
RBC # BLD AUTO: 3.53 M/UL
SATURATED IRON: 13 %
SODIUM SERPL-SCNC: 142 MMOL/L
TOTAL IRON BINDING CAPACITY: 428 UG/DL
TRANSFERRIN SERPL-MCNC: 289 MG/DL
VIT B12 SERPL-MCNC: 808 PG/ML
WBC # BLD AUTO: 8.79 K/UL

## 2017-09-11 PROCEDURE — 36415 COLL VENOUS BLD VENIPUNCTURE: CPT | Mod: PO

## 2017-09-11 PROCEDURE — 85025 COMPLETE CBC W/AUTO DIFF WBC: CPT

## 2017-09-11 PROCEDURE — 1159F MED LIST DOCD IN RCRD: CPT | Mod: S$GLB,,, | Performed by: FAMILY MEDICINE

## 2017-09-11 PROCEDURE — 82607 VITAMIN B-12: CPT

## 2017-09-11 PROCEDURE — 99499 UNLISTED E&M SERVICE: CPT | Mod: S$GLB,,, | Performed by: FAMILY MEDICINE

## 2017-09-11 PROCEDURE — 3008F BODY MASS INDEX DOCD: CPT | Mod: S$GLB,,, | Performed by: FAMILY MEDICINE

## 2017-09-11 PROCEDURE — 99214 OFFICE O/P EST MOD 30 MIN: CPT | Mod: S$GLB,,, | Performed by: FAMILY MEDICINE

## 2017-09-11 PROCEDURE — 3075F SYST BP GE 130 - 139MM HG: CPT | Mod: S$GLB,,, | Performed by: FAMILY MEDICINE

## 2017-09-11 PROCEDURE — 83540 ASSAY OF IRON: CPT

## 2017-09-11 PROCEDURE — 80048 BASIC METABOLIC PNL TOTAL CA: CPT

## 2017-09-11 PROCEDURE — 1126F AMNT PAIN NOTED NONE PRSNT: CPT | Mod: S$GLB,,, | Performed by: FAMILY MEDICINE

## 2017-09-11 PROCEDURE — 83735 ASSAY OF MAGNESIUM: CPT

## 2017-09-11 PROCEDURE — 82728 ASSAY OF FERRITIN: CPT

## 2017-09-11 PROCEDURE — 3079F DIAST BP 80-89 MM HG: CPT | Mod: S$GLB,,, | Performed by: FAMILY MEDICINE

## 2017-09-11 PROCEDURE — 99999 PR PBB SHADOW E&M-EST. PATIENT-LVL III: CPT | Mod: PBBFAC,,, | Performed by: FAMILY MEDICINE

## 2017-09-11 RX ORDER — MUPIROCIN 20 MG/G
OINTMENT TOPICAL 3 TIMES DAILY
Qty: 30 G | Refills: 0 | Status: SHIPPED | OUTPATIENT
Start: 2017-09-11 | End: 2018-02-14 | Stop reason: SDUPTHER

## 2017-09-11 RX ORDER — TRIAMCINOLONE ACETONIDE 1 MG/G
CREAM TOPICAL 2 TIMES DAILY
Qty: 80 G | Refills: 0 | Status: SHIPPED | OUTPATIENT
Start: 2017-09-11 | End: 2018-02-14 | Stop reason: SDUPTHER

## 2017-09-11 NOTE — PROGRESS NOTES
Subjective:      Patient ID: Soren Gasca is a 84 y.o. female.    Chief Complaint: Leg Pain and nasal sores    Disclaimer:  This note is prepared using voice recognition software and as such is likely to have errors and has not been proof read. Please contact me for questions.   Patient's coming in today with her daughter because over the last 1 week she was having some feelings of some abnormal leg movements that she always had to move her legs.  To the point where she actually had some pain and some spasms.  She reported this to her daughter and her daughter wanted her to come in to get checked out.  For about a week she hardly slept because of the amount of discomfort her legs were causing her.  They don't report any new oral medications but she recently had filled the size all prescription.  There's been no change to her Aricept.  She does report recently sleeping in her compression socks one night.  She's been having some recurrent leg swelling.  She does have varicosities noted.    She also reports a reoccurring nasal lesions that she states gets worse when she goes outside for any extended.  At time.  I called her in some Bactroban which she used all of it and then she got started on some neomycin.  Since then it appears to be more broth today on both the outer aspects.  She reports a lot of times she is a postnasal drip as well.    Of note the patient is currently on Lasix.  On her last set of lab work back in May she was noted to be anemic.  She has a history of thrombocytopenia status post the spleen removal.she is currently on B12        Lab Results   Component Value Date    WBC 8.18 05/02/2017    HGB 11.6 (L) 05/02/2017    HCT 34.5 (L) 05/02/2017     05/02/2017    CHOL 203 (H) 05/02/2017    TRIG 224 (H) 05/02/2017    HDL 56 05/02/2017    ALT 9 (L) 05/02/2017    AST 17 05/02/2017     05/02/2017    K 3.7 05/02/2017     05/02/2017    CREATININE 0.8 05/02/2017    BUN 21 05/02/2017    CO2  24 05/02/2017    TSH 1.490 05/02/2017    INR 1.0 12/03/2009    HGBA1C 5.8 05/02/2017       Review of Systems   Constitutional: Negative for activity change, appetite change and fatigue.   HENT: Positive for postnasal drip and rhinorrhea. Negative for facial swelling.         Nasal pain   Respiratory: Negative for cough and shortness of breath.    Cardiovascular: Positive for leg swelling. Negative for chest pain and palpitations.   Skin: Positive for color change, rash and wound.   Neurological: Negative for weakness and numbness.   Psychiatric/Behavioral: Positive for sleep disturbance.     Objective:     Physical Exam   Constitutional: She appears well-developed and well-nourished.   Musculoskeletal:        Right lower leg: She exhibits edema. She exhibits no tenderness, no bony tenderness and no swelling.        Left lower leg: She exhibits edema. She exhibits no tenderness, no bony tenderness and no swelling.   Skin: Skin is warm and dry. Abrasion and rash noted. Rash is urticarial. There is erythema.        Vitals reviewed.    Assessment:     1. Leg cramps    2. Erythematous nasal mucosa    3. Vitamin B12 deficiency disease    4. Thrombocytopenia    5. Anemia, unspecified type      Plan:   Soren was seen today for leg pain and nasal sores.    Diagnoses and all orders for this visit:    Leg cramps-new may be related to medications versus a left-sided bounces.  Obtain lab work today rule out iron issues her magnesium levels or low potassium levels.  -     CBC auto differential; Future  -     Ferritin; Future  -     Iron and TIBC; Future  -     Vitamin B12; Future  -     Basic metabolic panel; Future  -     Magnesium; Future    Erythematous nasal mucosa-new suspect now with an allergen to neomycin.  Stop neomycin.  Okay to use triamcinolone and will apply moisture.  Can send in a new prescription for Bactroban to use as needed.  -     CBC auto differential; Future  -     Ferritin; Future  -     Iron and TIBC;  Future  -     Vitamin B12; Future    Vitamin B12 deficiency disease-currently on supplementation continue for now  -     CBC auto differential; Future  -     Ferritin; Future  -     Iron and TIBC; Future  -     Vitamin B12; Future    Thrombocytopenia-noted with some mild anemia present.  Obtain iron studies.  -     CBC auto differential; Future  -     Ferritin; Future  -     Iron and TIBC; Future  -     Vitamin B12; Future  -     Magnesium; Future    Anemia, unspecified type-noted with some mild anemia present.  Obtain iron studies.    -     CBC auto differential; Future  -     Ferritin; Future  -     Iron and TIBC; Future  -     Vitamin B12; Future  -     Magnesium; Future    Other orders  -     mupirocin (BACTROBAN) 2 % ointment; Apply topically 3 (three) times daily. To nose  -     triamcinolone acetonide 0.1% (KENALOG) 0.1 % cream; Apply topically 2 (two) times daily.            Return if symptoms worsen or fail to improve.

## 2017-09-12 ENCOUNTER — TELEPHONE (OUTPATIENT)
Dept: INTERNAL MEDICINE | Facility: CLINIC | Age: 82
End: 2017-09-12

## 2017-09-12 LAB — FERRITIN SERPL-MCNC: 26 NG/ML

## 2017-09-12 RX ORDER — FERROUS SULFATE 325(65) MG
325 TABLET ORAL
Refills: 0 | COMMUNITY
Start: 2017-09-12 | End: 2019-01-15

## 2017-09-12 NOTE — TELEPHONE ENCOUNTER
Pt's iron is low and the anemia is mildly worse. Get started on daily iron pill. I sent in. This can cause the leg cramps/pain/movements. Cont with b12. Repeat labs in 6 months. Magnesium and electrolytes normal. If starts to get constipation from iron tablets then add in daily magnesium pill as well.

## 2017-09-13 ENCOUNTER — TELEPHONE (OUTPATIENT)
Dept: INTERNAL MEDICINE | Facility: CLINIC | Age: 82
End: 2017-09-13

## 2017-09-13 NOTE — TELEPHONE ENCOUNTER
----- Message from Radhika Roberto sent at 9/13/2017  9:23 AM CDT -----  Contact: Patient   Patient stated that she has pain in her leg and would like to know if Dr. Jones can make a appointment to the location where she can get help with her sciatic nerve she is not sure of the name of the place nor the Doctor's name she said it was a few years back when she went, Please call her at [phone].    Thanks  td

## 2017-09-13 NOTE — TELEPHONE ENCOUNTER
I returned call to patient and left a vm message for her, I called on yesterfay and advised patient of results which was part of the reason for her leg cramps and patient verbalized understanding.aa

## 2017-09-28 NOTE — TELEPHONE ENCOUNTER
----- Message from Susan Ng sent at 9/28/2017  4:26 PM CDT -----  Contact: moe-waldo   Would like to consult with nurse regarding status of refill for Rx medication alprazolam . Please call back at [phone].    Pt uses:  [facility] [id number] [address] [facility] [place] - [address]  [address] [facility] [address] [facility] [address]  Phone: [phone] Fax: [phone]    Thanks,  Susan Ng

## 2017-09-29 ENCOUNTER — TELEPHONE (OUTPATIENT)
Dept: INTERNAL MEDICINE | Facility: CLINIC | Age: 82
End: 2017-09-29

## 2017-09-29 RX ORDER — ALPRAZOLAM 0.25 MG/1
0.25 TABLET ORAL NIGHTLY PRN
Qty: 30 TABLET | Refills: 1 | Status: SHIPPED | OUTPATIENT
Start: 2017-09-29 | End: 2017-12-11 | Stop reason: SDUPTHER

## 2017-09-29 NOTE — TELEPHONE ENCOUNTER
----- Message from Susan Hernandez sent at 9/29/2017 10:38 AM CDT -----  Contact: [facility] pharmacy  1. What is the name of the medication you are requesting? Generic xanax   2. What is the dose? 0.25 mg  3. How do you take the medication? Orally, topically, etc? orally  4. How often do you take this medication? Once a night  5. Do you need a 30 day or 90 day supply? n/a  6. How many refills are you requesting? n/a  7. What is your preferred pharmacy and location of the pharmacy?   [facility] [address] [facility] [place] [place] - [address] [facility]  [address] [address]  Phone: [phone] Fax: [phone]    8. Who can we contact with further questions? [facility] pharmacy

## 2017-09-29 NOTE — TELEPHONE ENCOUNTER
Already sent to Dr. Jones for approval.   Called pt and left message that Dr. Jones out today, may not do this until Monday.

## 2017-10-03 RX ORDER — DONEPEZIL HYDROCHLORIDE 10 MG/1
10 TABLET, FILM COATED ORAL NIGHTLY
Qty: 30 TABLET | Refills: 11 | Status: SHIPPED | OUTPATIENT
Start: 2017-10-03 | End: 2018-02-19 | Stop reason: SDUPTHER

## 2017-10-03 NOTE — TELEPHONE ENCOUNTER
----- Message from Sadia Dumont sent at 10/3/2017  3:00 PM CDT -----  Contact: pt  The pt request a call concerning a medication refill, pt can be reached at [phone]///[id number]

## 2017-12-11 ENCOUNTER — OFFICE VISIT (OUTPATIENT)
Dept: INTERNAL MEDICINE | Facility: CLINIC | Age: 82
End: 2017-12-11
Payer: MEDICARE

## 2017-12-11 ENCOUNTER — LAB VISIT (OUTPATIENT)
Dept: LAB | Facility: HOSPITAL | Age: 82
End: 2017-12-11
Attending: FAMILY MEDICINE
Payer: MEDICARE

## 2017-12-11 VITALS
BODY MASS INDEX: 22.86 KG/M2 | HEIGHT: 63 IN | DIASTOLIC BLOOD PRESSURE: 80 MMHG | HEART RATE: 76 BPM | SYSTOLIC BLOOD PRESSURE: 120 MMHG | WEIGHT: 129 LBS | TEMPERATURE: 97 F

## 2017-12-11 DIAGNOSIS — R09.82 POSTNASAL DRIP: ICD-10-CM

## 2017-12-11 DIAGNOSIS — F41.1 GAD (GENERALIZED ANXIETY DISORDER): ICD-10-CM

## 2017-12-11 DIAGNOSIS — Z90.81 H/O SPLENECTOMY: Chronic | ICD-10-CM

## 2017-12-11 DIAGNOSIS — I83.90 VARICOSITIES OF LEG: Primary | ICD-10-CM

## 2017-12-11 DIAGNOSIS — R42 VERTIGO: ICD-10-CM

## 2017-12-11 DIAGNOSIS — D64.9 ANEMIA, UNSPECIFIED TYPE: ICD-10-CM

## 2017-12-11 LAB
ALBUMIN SERPL BCP-MCNC: 3.9 G/DL
ALP SERPL-CCNC: 81 U/L
ALT SERPL W/O P-5'-P-CCNC: 11 U/L
ANION GAP SERPL CALC-SCNC: 12 MMOL/L
AST SERPL-CCNC: 21 U/L
BASOPHILS # BLD AUTO: 0.06 K/UL
BASOPHILS NFR BLD: 0.6 %
BILIRUB SERPL-MCNC: 0.3 MG/DL
BUN SERPL-MCNC: 21 MG/DL
CALCIUM SERPL-MCNC: 9.8 MG/DL
CHLORIDE SERPL-SCNC: 101 MMOL/L
CO2 SERPL-SCNC: 27 MMOL/L
CREAT SERPL-MCNC: 1 MG/DL
DIFFERENTIAL METHOD: ABNORMAL
EOSINOPHIL # BLD AUTO: 0.1 K/UL
EOSINOPHIL NFR BLD: 0.6 %
ERYTHROCYTE [DISTWIDTH] IN BLOOD BY AUTOMATED COUNT: 13.7 %
EST. GFR  (AFRICAN AMERICAN): 59.8 ML/MIN/1.73 M^2
EST. GFR  (NON AFRICAN AMERICAN): 51.9 ML/MIN/1.73 M^2
FERRITIN SERPL-MCNC: 25 NG/ML
GLUCOSE SERPL-MCNC: 110 MG/DL
HCT VFR BLD AUTO: 39 %
HGB BLD-MCNC: 12.4 G/DL
IMM GRANULOCYTES # BLD AUTO: 0.02 K/UL
IMM GRANULOCYTES NFR BLD AUTO: 0.2 %
IRON SERPL-MCNC: 100 UG/DL
LYMPHOCYTES # BLD AUTO: 4.1 K/UL
LYMPHOCYTES NFR BLD: 40.8 %
MCH RBC QN AUTO: 31.3 PG
MCHC RBC AUTO-ENTMCNC: 31.8 G/DL
MCV RBC AUTO: 99 FL
MONOCYTES # BLD AUTO: 0.6 K/UL
MONOCYTES NFR BLD: 6.1 %
NEUTROPHILS # BLD AUTO: 5.2 K/UL
NEUTROPHILS NFR BLD: 51.7 %
NRBC BLD-RTO: 0 /100 WBC
PLATELET # BLD AUTO: 240 K/UL
PMV BLD AUTO: 12.7 FL
POTASSIUM SERPL-SCNC: 3.6 MMOL/L
PROT SERPL-MCNC: 8 G/DL
RBC # BLD AUTO: 3.96 M/UL
SATURATED IRON: 21 %
SODIUM SERPL-SCNC: 140 MMOL/L
TOTAL IRON BINDING CAPACITY: 468 UG/DL
TRANSFERRIN SERPL-MCNC: 316 MG/DL
WBC # BLD AUTO: 10.1 K/UL

## 2017-12-11 PROCEDURE — 83540 ASSAY OF IRON: CPT

## 2017-12-11 PROCEDURE — 85025 COMPLETE CBC W/AUTO DIFF WBC: CPT

## 2017-12-11 PROCEDURE — 82728 ASSAY OF FERRITIN: CPT

## 2017-12-11 PROCEDURE — 90662 IIV NO PRSV INCREASED AG IM: CPT | Mod: S$GLB,,, | Performed by: FAMILY MEDICINE

## 2017-12-11 PROCEDURE — 99499 UNLISTED E&M SERVICE: CPT | Mod: ,,, | Performed by: FAMILY MEDICINE

## 2017-12-11 PROCEDURE — G0008 ADMIN INFLUENZA VIRUS VAC: HCPCS | Mod: S$GLB,,, | Performed by: FAMILY MEDICINE

## 2017-12-11 PROCEDURE — 36415 COLL VENOUS BLD VENIPUNCTURE: CPT | Mod: PO

## 2017-12-11 PROCEDURE — 99214 OFFICE O/P EST MOD 30 MIN: CPT | Mod: S$GLB,,, | Performed by: FAMILY MEDICINE

## 2017-12-11 PROCEDURE — 80053 COMPREHEN METABOLIC PANEL: CPT

## 2017-12-11 PROCEDURE — 99999 PR PBB SHADOW E&M-EST. PATIENT-LVL III: CPT | Mod: PBBFAC,,, | Performed by: FAMILY MEDICINE

## 2017-12-11 PROCEDURE — 99499 UNLISTED E&M SERVICE: CPT | Mod: S$GLB,,, | Performed by: FAMILY MEDICINE

## 2017-12-11 RX ORDER — ALPRAZOLAM 0.25 MG/1
0.25 TABLET ORAL NIGHTLY PRN
Qty: 30 TABLET | Refills: 5 | Status: SHIPPED | OUTPATIENT
Start: 2017-12-11 | End: 2018-02-14 | Stop reason: SDUPTHER

## 2017-12-11 RX ORDER — ALPRAZOLAM 0.25 MG/1
0.25 TABLET ORAL NIGHTLY PRN
Qty: 30 TABLET | Refills: 1 | Status: CANCELLED | OUTPATIENT
Start: 2017-12-11

## 2017-12-11 RX ORDER — GUAIFENESIN 600 MG/1
600 TABLET, EXTENDED RELEASE ORAL 2 TIMES DAILY
Qty: 100 TABLET | Refills: 3
Start: 2017-12-11 | End: 2019-01-15

## 2017-12-11 RX ORDER — METOPROLOL TARTRATE 25 MG/1
25 TABLET, FILM COATED ORAL 2 TIMES DAILY
Qty: 180 TABLET | Refills: 3 | Status: SHIPPED | OUTPATIENT
Start: 2017-12-11 | End: 2018-02-14 | Stop reason: SDUPTHER

## 2017-12-11 RX ORDER — PANTOPRAZOLE SODIUM 40 MG/1
40 TABLET, DELAYED RELEASE ORAL DAILY
Qty: 90 TABLET | Refills: 3 | Status: SHIPPED | OUTPATIENT
Start: 2017-12-11 | End: 2018-02-14 | Stop reason: SDUPTHER

## 2017-12-11 RX ORDER — MECLIZINE HYDROCHLORIDE 25 MG/1
25 TABLET ORAL 3 TIMES DAILY PRN
Qty: 90 TABLET | Refills: 3 | Status: SHIPPED | OUTPATIENT
Start: 2017-12-11 | End: 2018-02-05 | Stop reason: SDUPTHER

## 2017-12-17 NOTE — PROGRESS NOTES
Subjective:      Patient ID: Soren Gasca is a 84 y.o. female.    Chief Complaint: Follow-up (6m)    Disclaimer:  This note is prepared using voice recognition software and as such is likely to have errors and has not been proof read. Please contact me for questions.   Patient's coming in today with her daughter for follow-up.  Since I last saw she's doing much better from her leg cramps.  She started wearing her compression socks which have helped.  She reports her leg pain and leg swelling has improved dramatically.      She also reports that she is once again having a good bit of postnasal drip.  She is uncertain if she's been taking her antihistamine regulate.  There's been a weather change and she chronically suffers from this.  No fever and chills though.    She's also needing to obtain blood work as she has a history of a splenectomy and has a history of low platelets and anemia.  She's not currently taking iron at this time.  She's willing to do this today.    She also reports that lately she's been out of some of her medicines.  She believes one of them is her vertigo medicine.  For this reason she like to have her refill the meclizine.    An anxiety standpoint she's needing a refill of her alprazolam.  She seems to do well with this mainly taking this at night help her to rest.  She also is on Aricept for dementia.  She's currently managed for this by Dr. Powers.            Lab Results   Component Value Date    WBC 10.10 12/11/2017    HGB 12.4 12/11/2017    HCT 39.0 12/11/2017     12/11/2017    CHOL 203 (H) 05/02/2017    TRIG 224 (H) 05/02/2017    HDL 56 05/02/2017    ALT 11 12/11/2017    AST 21 12/11/2017     12/11/2017    K 3.6 12/11/2017     12/11/2017    CREATININE 1.0 12/11/2017    BUN 21 12/11/2017    CO2 27 12/11/2017    TSH 1.490 05/02/2017    INR 1.0 12/03/2009    HGBA1C 5.8 05/02/2017       Review of Systems   Constitutional: Negative for activity change, appetite change,  fatigue and fever.   HENT: Positive for postnasal drip and rhinorrhea.    Respiratory: Negative for cough and shortness of breath.    Cardiovascular: Negative for chest pain and palpitations.   Gastrointestinal: Negative for abdominal distention and abdominal pain.   Psychiatric/Behavioral: Positive for confusion. Negative for decreased concentration and dysphoric mood. The patient is nervous/anxious.      Objective:     Physical Exam   Constitutional: She appears well-developed and well-nourished.   HENT:   Head: Normocephalic and atraumatic.   Right Ear: Tympanic membrane normal.   Left Ear: Tympanic membrane normal.   Nose: Mucosal edema and rhinorrhea present.   Mouth/Throat: Oropharynx is clear and moist.   Eyes: Conjunctivae and EOM are normal.   Neck: Normal range of motion. Neck supple.   Cardiovascular: Normal rate and regular rhythm.    Pulmonary/Chest: Effort normal and breath sounds normal.   Psychiatric: She has a normal mood and affect. Her behavior is normal.   Nursing note and vitals reviewed.    Assessment:     1. Varicosities of leg    2. Postnasal drip    3. H/O splenectomy    4. MARY (generalized anxiety disorder)    5. Anemia, unspecified type    6. Vertigo      Plan:   Soren was seen today for follow-up.    Diagnoses and all orders for this visit:    Varicosities of leg-improved with compression stockings.    Postnasal drip-worse lately due to weather changes can add in Flonase again.  Also can use antihistamine  -     CBC auto differential; Future  -     Ferritin; Future  -     Iron and TIBC; Future  -     Comprehensive metabolic panel; Future    H/O splenectomy-checking lab work every 3-6 months for platelet counts and blood counts.  Obtain today  -     CBC auto differential; Future  -     Ferritin; Future  -     Iron and TIBC; Future  -     Comprehensive metabolic panel; Future    MARY (generalized anxiety disorder)-stable with daily use of Xanax.  Refill today.  Also currently being managed  by dementia with Dr. Powers.  On Aricept  -     CBC auto differential; Future  -     Ferritin; Future  -     Iron and TIBC; Future  -     Comprehensive metabolic panel; Future    Anemia, unspecified type-repeating lab work today also screen for iron deficiencies  -     CBC auto differential; Future  -     Ferritin; Future  -     Iron and TIBC; Future  -     Comprehensive metabolic panel; Future    Vertigo-worse lately refilled meclizine per patient request  -     CBC auto differential; Future  -     Ferritin; Future  -     Iron and TIBC; Future  -     Comprehensive metabolic panel; Future    Other orders  -     meclizine (ANTIVERT) 25 mg tablet; Take 1 tablet (25 mg total) by mouth 3 (three) times daily as needed.  -     metoprolol tartrate (LOPRESSOR) 25 MG tablet; Take 1 tablet (25 mg total) by mouth 2 (two) times daily.  -     pantoprazole (PROTONIX) 40 MG tablet; Take 1 tablet (40 mg total) by mouth once daily.  -     ALPRAZolam (XANAX) 0.25 MG tablet; Take 1 tablet (0.25 mg total) by mouth nightly as needed.  -     guaiFENesin (MUCINEX) 600 mg 12 hr tablet; Take 1 tablet (600 mg total) by mouth 2 (two) times daily.  -     Influenza - High Dose (65+) (PF) (IM)            Return in about 6 months (around 6/11/2018) for chronic issues Dr Jones.

## 2018-02-01 ENCOUNTER — PATIENT MESSAGE (OUTPATIENT)
Dept: INTERNAL MEDICINE | Facility: CLINIC | Age: 83
End: 2018-02-01

## 2018-02-05 RX ORDER — MECLIZINE HYDROCHLORIDE 25 MG/1
25 TABLET ORAL 3 TIMES DAILY PRN
Qty: 90 TABLET | Refills: 3 | Status: SHIPPED | OUTPATIENT
Start: 2018-02-05 | End: 2018-02-05 | Stop reason: SDUPTHER

## 2018-02-05 RX ORDER — MECLIZINE HYDROCHLORIDE 25 MG/1
25 TABLET ORAL 3 TIMES DAILY PRN
Qty: 90 TABLET | Refills: 3 | Status: SHIPPED | OUTPATIENT
Start: 2018-02-05 | End: 2018-04-02 | Stop reason: SDUPTHER

## 2018-02-09 ENCOUNTER — TELEPHONE (OUTPATIENT)
Dept: INTERNAL MEDICINE | Facility: CLINIC | Age: 83
End: 2018-02-09

## 2018-02-09 NOTE — TELEPHONE ENCOUNTER
----- Message from Winter Jones sent at 2/9/2018 11:13 AM CST -----  Contact: Sanjeev/ [facility]   Caller states they sent a request for the pt's medication a week ago and haven't heard back yet. Request call back at [phone]

## 2018-02-14 ENCOUNTER — PATIENT MESSAGE (OUTPATIENT)
Dept: INTERNAL MEDICINE | Facility: CLINIC | Age: 83
End: 2018-02-14

## 2018-02-14 RX ORDER — TRIAMCINOLONE ACETONIDE 1 MG/G
CREAM TOPICAL 2 TIMES DAILY
Qty: 80 G | Refills: 0 | Status: SHIPPED | OUTPATIENT
Start: 2018-02-14 | End: 2018-02-27 | Stop reason: SDUPTHER

## 2018-02-14 RX ORDER — MUPIROCIN 20 MG/G
OINTMENT TOPICAL 3 TIMES DAILY
Qty: 30 G | Refills: 3 | Status: SHIPPED | OUTPATIENT
Start: 2018-02-14 | End: 2020-05-27

## 2018-02-14 RX ORDER — ESTRADIOL 0.5 MG/1
0.5 TABLET ORAL DAILY
Qty: 90 TABLET | Refills: 2 | Status: SHIPPED | OUTPATIENT
Start: 2018-02-14 | End: 2019-01-15

## 2018-02-14 RX ORDER — METOPROLOL TARTRATE 25 MG/1
25 TABLET, FILM COATED ORAL 2 TIMES DAILY
Qty: 180 TABLET | Refills: 3 | Status: SHIPPED | OUTPATIENT
Start: 2018-02-14 | End: 2019-01-15

## 2018-02-14 RX ORDER — PANTOPRAZOLE SODIUM 40 MG/1
40 TABLET, DELAYED RELEASE ORAL DAILY
Qty: 90 TABLET | Refills: 3 | Status: SHIPPED | OUTPATIENT
Start: 2018-02-14 | End: 2019-01-15 | Stop reason: SDUPTHER

## 2018-02-14 RX ORDER — ALPRAZOLAM 0.25 MG/1
0.25 TABLET ORAL NIGHTLY PRN
Qty: 90 TABLET | Refills: 1 | Status: SHIPPED | OUTPATIENT
Start: 2018-02-14 | End: 2018-09-17 | Stop reason: SDUPTHER

## 2018-02-14 RX ORDER — FUROSEMIDE 20 MG/1
20 TABLET ORAL DAILY
Qty: 90 TABLET | Refills: 2 | Status: SHIPPED | OUTPATIENT
Start: 2018-02-14 | End: 2018-09-17 | Stop reason: SDUPTHER

## 2018-02-14 NOTE — TELEPHONE ENCOUNTER
metroprolol tartrate, pantoprazole, alprazolam, mupirocin 2 % ointment, triamcinolone acetonide cream, furosemide, estradiol

## 2018-02-16 ENCOUNTER — PATIENT MESSAGE (OUTPATIENT)
Dept: INTERNAL MEDICINE | Facility: CLINIC | Age: 83
End: 2018-02-16

## 2018-02-19 ENCOUNTER — OFFICE VISIT (OUTPATIENT)
Dept: NEUROLOGY | Facility: CLINIC | Age: 83
End: 2018-02-19
Payer: MEDICARE

## 2018-02-19 VITALS
SYSTOLIC BLOOD PRESSURE: 152 MMHG | WEIGHT: 133.38 LBS | HEART RATE: 72 BPM | BODY MASS INDEX: 23.63 KG/M2 | HEIGHT: 63 IN | DIASTOLIC BLOOD PRESSURE: 76 MMHG

## 2018-02-19 DIAGNOSIS — G31.84 MILD COGNITIVE IMPAIRMENT WITH MEMORY LOSS: Primary | Chronic | ICD-10-CM

## 2018-02-19 DIAGNOSIS — R29.818 NEUROLOGICAL DEFICIT PRESENT: ICD-10-CM

## 2018-02-19 PROCEDURE — 1126F AMNT PAIN NOTED NONE PRSNT: CPT | Mod: S$GLB,,, | Performed by: PSYCHIATRY & NEUROLOGY

## 2018-02-19 PROCEDURE — 1159F MED LIST DOCD IN RCRD: CPT | Mod: S$GLB,,, | Performed by: PSYCHIATRY & NEUROLOGY

## 2018-02-19 PROCEDURE — 99214 OFFICE O/P EST MOD 30 MIN: CPT | Mod: S$GLB,,, | Performed by: PSYCHIATRY & NEUROLOGY

## 2018-02-19 PROCEDURE — 99999 PR PBB SHADOW E&M-EST. PATIENT-LVL III: CPT | Mod: PBBFAC,,, | Performed by: PSYCHIATRY & NEUROLOGY

## 2018-02-19 PROCEDURE — 3008F BODY MASS INDEX DOCD: CPT | Mod: S$GLB,,, | Performed by: PSYCHIATRY & NEUROLOGY

## 2018-02-19 RX ORDER — DONEPEZIL HYDROCHLORIDE 10 MG/1
10 TABLET, FILM COATED ORAL NIGHTLY
Qty: 90 TABLET | Refills: 3 | Status: SHIPPED | OUTPATIENT
Start: 2018-02-19 | End: 2018-02-20 | Stop reason: SDUPTHER

## 2018-02-19 NOTE — PROGRESS NOTES
This 84-year-old patient who has a prior history of mild cognitive impairment is accompanied to the office today by her daughter.  The patient and the daughter both indicate that the patient's memory has seemingly gotten worse since last being seen.  The patient states that she is forgetting frequently and gives multiple examples such as going from one room to the next and then not recalling why she entered the room.  Her daughter indicates that she was very frequently repeating comments and questions and that she frequently will forget the train of thought in conversations.  The daughter indicates that she is now helping her mother with her financial affairs but has not yet obtained a power of  or DURABLE POWER OF .  The daughter indicates that the patient remains independent for all instrumental activities of daily living.    The patient mentions the fact that she has noted her right side to feel weaker than it had been in the past.  The daughter indicates that her mother had not mentioned this previously.  The patient further indicates that when walking she has a tendency to drift to the right.  The patient denies any headache.  She is not noticed any involuntary movements.  She's not had any loss of consciousness.      ROS:  GENERAL: No fever, chills, fatigability or weight loss.  SKIN: No rashes, itching or changes in color or texture of skin.  HEAD: No headaches or recent head trauma.  EYES: Visual acuity fine. No photophobia, ocular pain or diplopia.  EARS: Denies ear pain, discharge or vertigo.  NOSE: No loss of smell, no epistaxis or postnasal drip.  MOUTH & THROAT: No hoarseness or change in voice. No excessive gum bleeding.  NODES: Denies swollen glands.  CHEST: Denies BETANCUR, cyanosis, wheezing, cough and sputum production.  CARDIOVASCULAR: Denies chest pain, PND, orthopnea or reduced exercise tolerance.  ABDOMEN: Appetite fine. No weight loss. Denies diarrhea, abdominal pain, hematemesis or  blood in stool.  URINARY: No flank pain, dysuria or hematuria.  PERIPHERAL VASCULAR: No claudication or cyanosis.  MUSCULOSKELETAL: No joint stiffness or swelling. Denies back pain.  NEUROLOGIC: No history of seizures, paralysis, or unexplained loss of consciousness.    The patient's past history, family history and social history have been reviewed with the patient, her daughter and the electronic medical records.    PE:   VITAL SIGNS: Blood pressure 152/76, pulse 72, weight 60.5 kg, height 5 foot 3 inches, BMI 23.63  APPEARANCE: Well nourished, well developed, in no acute distress.  The patient is very clean and neatly dressed.    HEAD: Normocephalic, atraumatic.  EYES: PERRL. EOMI.  Non-icteric sclerae.    EARS: TM's intact. Light reflex normal. No retraction or perforation.    NOSE: Mucosa pink. Airway clear.  MOUTH & THROAT: No tonsillar enlargement. No pharyngeal erythema or exudate. No stridor.  NECK: Supple. No bruits.  CHEST: Lungs clear to auscultation.  CARDIOVASCULAR: Regular rhythm without significant murmurs.  MUSCULOSKELETAL:  No bony deformity seen.  Muscle tone is normal in both upper and both lower extremities.  Muscle mass is consistent with patient's advanced age.  NEUROLOGIC:   Mental Status:  The patient is well oriented to person, time, place, and situation.  She did repeat comments several times during the course of the visit.  She is able to follow 2 and three-step commands.  It should be noted that she is slightly hard of hearing.  The patient is attentive to the environment and cooperative for the exam.  Cranial Nerves: II-XII grossly intact. Fundoscopic exam is normal.  No hemorrhage, exudate or papilledema is present. The extraocular muscles are intact in the cardinal directions of gaze.  No ptosis is present. Facial features are symmetrical.  Speech is marked by frequent difficulties with word finding.  Tongue protrudes in the midline.    Gait and Station:  Romberg is negative.  Good  alternate armswing with normal gait.  Motor:  No downdrift of either arm when held at shoulder level.  Manual muscle testing of proximal and distal muscles of both upper and lower extremities is normal.  Sensory:  Intact both upper and lower extremities to pin prick, touch, and vibration.  Cerebellar:  Finger to nose shows some dyssynergia on the right side with finger to nose.  Alternating movements seem awkward more so on the left than on the right.  Reflexes:  Stretch reflexes are slightly asymmetrical and seemed slightly more active on the right than on the left.  Plantar stimulation is flexor bilaterally and no pathological reflexes are seen        ASSESSMENT:  1.  History of mild cognitive impairment with memory loss but now developing symptoms of right-sided weakness which is a new neurological finding for the patient.    RECOMMENDATIONS:  1.  Schedule MRI brain because of new neurological deficits of right-sided weakness and clumsiness  2.  Continue medicines as previously prescribed for now.  Depending upon results of MRI, further recommendations will be made.  This note is generated with speech recognition software and is subject to transcription error and sound alike phrases that may be missed by proofreading.

## 2018-02-20 RX ORDER — DONEPEZIL HYDROCHLORIDE 10 MG/1
10 TABLET, FILM COATED ORAL NIGHTLY
Qty: 90 TABLET | Refills: 3 | Status: SHIPPED | OUTPATIENT
Start: 2018-02-20 | End: 2019-01-15 | Stop reason: SDUPTHER

## 2018-02-27 RX ORDER — TRIAMCINOLONE ACETONIDE 1 MG/G
CREAM TOPICAL 2 TIMES DAILY
Qty: 80 G | Refills: 0 | Status: SHIPPED | OUTPATIENT
Start: 2018-02-27 | End: 2020-05-27

## 2018-03-01 ENCOUNTER — HOSPITAL ENCOUNTER (OUTPATIENT)
Dept: RADIOLOGY | Facility: HOSPITAL | Age: 83
Discharge: HOME | End: 2018-03-01
Attending: PSYCHIATRY & NEUROLOGY
Payer: MEDICARE

## 2018-03-01 DIAGNOSIS — R29.818 NEUROLOGICAL DEFICIT PRESENT: ICD-10-CM

## 2018-03-01 DIAGNOSIS — G31.84 MILD COGNITIVE IMPAIRMENT WITH MEMORY LOSS: Chronic | ICD-10-CM

## 2018-03-01 PROCEDURE — 70551 MRI BRAIN STEM W/O DYE: CPT | Mod: TC

## 2018-03-30 ENCOUNTER — PATIENT MESSAGE (OUTPATIENT)
Dept: INTERNAL MEDICINE | Facility: CLINIC | Age: 83
End: 2018-03-30

## 2018-04-02 RX ORDER — MECLIZINE HYDROCHLORIDE 25 MG/1
25 TABLET ORAL 3 TIMES DAILY PRN
Qty: 90 TABLET | Refills: 3 | Status: SHIPPED | OUTPATIENT
Start: 2018-04-02 | End: 2018-04-04 | Stop reason: SDUPTHER

## 2018-04-03 ENCOUNTER — TELEPHONE (OUTPATIENT)
Dept: FAMILY MEDICINE | Facility: CLINIC | Age: 83
End: 2018-04-03

## 2018-04-03 ENCOUNTER — PATIENT MESSAGE (OUTPATIENT)
Dept: INTERNAL MEDICINE | Facility: CLINIC | Age: 83
End: 2018-04-03

## 2018-04-03 ENCOUNTER — TELEPHONE (OUTPATIENT)
Dept: INTERNAL MEDICINE | Facility: CLINIC | Age: 83
End: 2018-04-03

## 2018-04-03 ENCOUNTER — HOSPITAL ENCOUNTER (OUTPATIENT)
Dept: RADIOLOGY | Facility: HOSPITAL | Age: 83
Discharge: HOME | End: 2018-04-03
Payer: MEDICARE

## 2018-04-03 ENCOUNTER — OFFICE VISIT (OUTPATIENT)
Dept: INTERNAL MEDICINE | Facility: CLINIC | Age: 83
End: 2018-04-03
Payer: MEDICARE

## 2018-04-03 VITALS
SYSTOLIC BLOOD PRESSURE: 132 MMHG | WEIGHT: 133.81 LBS | TEMPERATURE: 98 F | HEIGHT: 63 IN | HEART RATE: 66 BPM | BODY MASS INDEX: 23.71 KG/M2 | DIASTOLIC BLOOD PRESSURE: 70 MMHG

## 2018-04-03 DIAGNOSIS — K92.1 BLACK TARRY STOOLS: ICD-10-CM

## 2018-04-03 DIAGNOSIS — K92.1 BLACK TARRY STOOLS: Primary | ICD-10-CM

## 2018-04-03 PROCEDURE — 3078F DIAST BP <80 MM HG: CPT | Mod: CPTII,S$GLB,,

## 2018-04-03 PROCEDURE — 99999 PR PBB SHADOW E&M-EST. PATIENT-LVL IV: CPT | Mod: PBBFAC,,,

## 2018-04-03 PROCEDURE — 74018 RADEX ABDOMEN 1 VIEW: CPT | Mod: TC,FY,PO

## 2018-04-03 PROCEDURE — 99213 OFFICE O/P EST LOW 20 MIN: CPT | Mod: S$GLB,,,

## 2018-04-03 PROCEDURE — 3075F SYST BP GE 130 - 139MM HG: CPT | Mod: CPTII,S$GLB,,

## 2018-04-03 PROCEDURE — 74018 RADEX ABDOMEN 1 VIEW: CPT | Mod: 26,,, | Performed by: RADIOLOGY

## 2018-04-03 NOTE — TELEPHONE ENCOUNTER
----- Message from Chris Delvalle sent at 4/3/2018 10:14 AM CDT -----  Contact: pt   Pt is requesting a call back from nurse in regards to pt having black stool and pt has bad stomach pain and needs to be seen by  Sooner than May 7, 2018.  Pt is really worried and wants to see  today         [phone] (home)

## 2018-04-03 NOTE — TELEPHONE ENCOUNTER
----- Message from Tee Hoff sent at 4/3/2018  3:17 PM CDT -----  Contact: Pt   Pt returned a phone call..[phone] (kems)

## 2018-04-03 NOTE — PROGRESS NOTES
Subjective:       Patient ID: Soren Gasca is a 84 y.o. female.    Chief Complaint: Stool Color Change (dark stool x 3 days hard stool black in color and lower abdominal discomfort)    HPI   Patient presents today with a 10 day complaint of dark tarry stools.  She says this has been constant since starting.  She voices associated abdominal bloating and discomfort.  She states that she has started taking Metamucil recently.  The patient and I asked taking any bismuth containing products.  She does take an iron supplement but has been on that for many months.  The patient denies any bright red blood per stool she denies any rectal pain.  She cannot identify exacerbating or mitigating factors.    Review of Systems   Constitutional: Negative for activity change, appetite change, fatigue and unexpected weight change.   HENT: Negative.    Eyes: Negative.    Respiratory: Negative for cough, chest tightness, shortness of breath and wheezing.    Cardiovascular: Negative for chest pain, palpitations and leg swelling.   Gastrointestinal: Positive for abdominal distention (mild). Negative for constipation, diarrhea, nausea and vomiting.        Dark tarry stools   Endocrine: Negative.    Genitourinary: Negative.    Musculoskeletal: Negative.    Skin: Negative for color change.   Allergic/Immunologic: Negative.    Neurological: Negative for dizziness, weakness and light-headedness.   Hematological: Negative.    Psychiatric/Behavioral: Negative for sleep disturbance.         Objective:      Physical Exam   Constitutional: She is oriented to person, place, and time. She appears well-developed and well-nourished.   HENT:   Head: Normocephalic and atraumatic.   Eyes: Conjunctivae are normal. Pupils are equal, round, and reactive to light. No scleral icterus.   Neck: Normal range of motion. Neck supple.   Cardiovascular: Normal rate, regular rhythm, normal heart sounds and intact distal pulses.  Exam reveals no gallop and no  friction rub.    No murmur heard.  Pulmonary/Chest: Effort normal and breath sounds normal. No respiratory distress. She has no wheezes. She has no rales. She exhibits no tenderness.   Abdominal: Soft. Normal appearance and bowel sounds are normal. She exhibits no shifting dullness, no distension, no pulsatile liver, no fluid wave, no abdominal bruit, no ascites, no pulsatile midline mass and no mass. There is no hepatosplenomegaly. There is tenderness in the suprapubic area. There is no rigidity, no rebound, no guarding, no CVA tenderness, no tenderness at McBurney's point and negative Banegas's sign. No hernia.   Musculoskeletal: Normal range of motion.   Lymphadenopathy:     She has no cervical adenopathy.   Neurological: She is alert and oriented to person, place, and time. She exhibits normal muscle tone. Coordination normal.   Skin: Skin is warm and dry. No rash noted. No erythema. No pallor.   Psychiatric: She has a normal mood and affect. Her behavior is normal. Judgment and thought content normal.   Vitals reviewed.      Assessment:       1. Black tarry stools          Plan:   Black tarry stools  -     Occult blood x 1, stool; Future; Expected date: 04/03/2018  -     Occult blood x 1, stool; Future; Expected date: 04/03/2018  -     Occult blood x 1, stool; Future; Expected date: 04/03/2018  -     X-Ray Abdomen AP 1 View; Future; Expected date: 04/03/2018  -     CBC auto differential; Future; Expected date: 04/03/2018  -     Comprehensive metabolic panel; Future; Expected date: 04/03/2018            Disclaimer: This note is prepared using voice recognition software.

## 2018-04-04 ENCOUNTER — PATIENT MESSAGE (OUTPATIENT)
Dept: INTERNAL MEDICINE | Facility: CLINIC | Age: 83
End: 2018-04-04

## 2018-04-04 RX ORDER — MECLIZINE HYDROCHLORIDE 25 MG/1
25 TABLET ORAL 3 TIMES DAILY PRN
Qty: 270 TABLET | Refills: 3 | Status: SHIPPED | OUTPATIENT
Start: 2018-04-04 | End: 2019-01-15

## 2018-04-05 ENCOUNTER — LAB VISIT (OUTPATIENT)
Dept: LAB | Facility: HOSPITAL | Age: 83
End: 2018-04-05
Payer: MEDICARE

## 2018-04-05 DIAGNOSIS — K92.1 BLACK TARRY STOOLS: ICD-10-CM

## 2018-04-05 PROCEDURE — 82272 OCCULT BLD FECES 1-3 TESTS: CPT | Mod: 91

## 2018-04-06 DIAGNOSIS — K92.1 BLACK TARRY STOOLS: Primary | ICD-10-CM

## 2018-04-06 LAB — OB PNL STL: NEGATIVE

## 2018-05-15 ENCOUNTER — OFFICE VISIT (OUTPATIENT)
Dept: OPHTHALMOLOGY | Facility: CLINIC | Age: 83
End: 2018-05-15
Payer: MEDICARE

## 2018-05-15 DIAGNOSIS — H35.341 MACULAR HOLE OF RIGHT EYE: Primary | ICD-10-CM

## 2018-05-15 PROCEDURE — 99999 PR PBB SHADOW E&M-EST. PATIENT-LVL II: CPT | Mod: PBBFAC,,, | Performed by: OPHTHALMOLOGY

## 2018-05-15 PROCEDURE — 92014 COMPRE OPH EXAM EST PT 1/>: CPT | Mod: S$GLB,,, | Performed by: OPHTHALMOLOGY

## 2018-05-15 NOTE — PROGRESS NOTES
===============================  05/16/2018   Soren Gasca,   84 y.o. female   Last visit [facility]: :6/14/2016   Last visit eye dept. Visit date not found  [facility]:  Corrected distance visual acuity was 20/60 in the right eye and 20/40 in the left eye.  Tonometry     Tonometry (Applanation, 12:29 PM)       Right Left    Pressure 16 14              Wearing Rx     Wearing Rx       Sphere Cylinder Axis Add    Right +0.50 +1.50 005 +3.00    Left [place] +2.25 010 +3.00    Type:  PAL              Manifest Refraction     Manifest Refraction       Sphere Cylinder Axis Dist [facility]    Right +0.75 +1.25 005 20/50    Left -0.25 +2.25 010 20/30              Chief Complaint   Patient presents with    Macular Degeneration     YEARLY EXAM    ERM        HPI     Macular Degeneration    Additional comments: YEARLY EXAM           Comments   1.) OD ERM  2.) OD Old mac hole / mac pexy / laser indirect 1/2011 dr laws  3.) Pciol ou YAG OS 6/1/16  4.) SMD       Last edited by Olya Gallegos on 5/15/2018 12:15 PM. (History)          ________________  5/15/2018  Problem List Items Addressed This Visit        Eye/Vision problems    Macular hole - Primary        od lmh erm  Os  Ok  rtc 1 vincent  .       ===========================

## 2018-05-27 ENCOUNTER — PATIENT MESSAGE (OUTPATIENT)
Dept: INTERNAL MEDICINE | Facility: CLINIC | Age: 83
End: 2018-05-27

## 2018-06-11 ENCOUNTER — LAB VISIT (OUTPATIENT)
Dept: LAB | Facility: HOSPITAL | Age: 83
End: 2018-06-11
Attending: FAMILY MEDICINE
Payer: MEDICARE

## 2018-06-11 ENCOUNTER — OFFICE VISIT (OUTPATIENT)
Dept: INTERNAL MEDICINE | Facility: CLINIC | Age: 83
End: 2018-06-11
Payer: MEDICARE

## 2018-06-11 VITALS
BODY MASS INDEX: 22.35 KG/M2 | SYSTOLIC BLOOD PRESSURE: 139 MMHG | HEIGHT: 63 IN | DIASTOLIC BLOOD PRESSURE: 72 MMHG | HEART RATE: 60 BPM | WEIGHT: 126.13 LBS | TEMPERATURE: 98 F

## 2018-06-11 DIAGNOSIS — E55.9 VITAMIN D DEFICIENCY: ICD-10-CM

## 2018-06-11 DIAGNOSIS — E78.5 HYPERLIPIDEMIA, UNSPECIFIED HYPERLIPIDEMIA TYPE: Chronic | ICD-10-CM

## 2018-06-11 DIAGNOSIS — D69.6 THROMBOCYTOPENIA: Primary | Chronic | ICD-10-CM

## 2018-06-11 DIAGNOSIS — I70.0 CALCIFICATION OF AORTA: Chronic | ICD-10-CM

## 2018-06-11 DIAGNOSIS — Z90.81 H/O SPLENECTOMY: Chronic | ICD-10-CM

## 2018-06-11 DIAGNOSIS — G31.84 MILD COGNITIVE IMPAIRMENT WITH MEMORY LOSS: Chronic | ICD-10-CM

## 2018-06-11 DIAGNOSIS — I10 ESSENTIAL HYPERTENSION: Chronic | ICD-10-CM

## 2018-06-11 DIAGNOSIS — R19.7 DIARRHEA, UNSPECIFIED TYPE: ICD-10-CM

## 2018-06-11 DIAGNOSIS — M51.37 DDD (DEGENERATIVE DISC DISEASE), LUMBOSACRAL: ICD-10-CM

## 2018-06-11 DIAGNOSIS — M41.9 SCOLIOSIS OF LUMBOSACRAL SPINE, UNSPECIFIED SCOLIOSIS TYPE: ICD-10-CM

## 2018-06-11 DIAGNOSIS — Z00.00 ROUTINE GENERAL MEDICAL EXAMINATION AT A HEALTH CARE FACILITY: ICD-10-CM

## 2018-06-11 DIAGNOSIS — D69.6 THROMBOCYTOPENIA: Chronic | ICD-10-CM

## 2018-06-11 LAB
25(OH)D3+25(OH)D2 SERPL-MCNC: 57 NG/ML
ALBUMIN SERPL BCP-MCNC: 4 G/DL
ALP SERPL-CCNC: 56 U/L
ALT SERPL W/O P-5'-P-CCNC: 10 U/L
ANION GAP SERPL CALC-SCNC: 10 MMOL/L
AST SERPL-CCNC: 17 U/L
BASOPHILS # BLD AUTO: 0.04 K/UL
BASOPHILS NFR BLD: 0.4 %
BILIRUB SERPL-MCNC: 0.4 MG/DL
BUN SERPL-MCNC: 13 MG/DL
CALCIUM SERPL-MCNC: 9.6 MG/DL
CHLORIDE SERPL-SCNC: 103 MMOL/L
CHOLEST SERPL-MCNC: 195 MG/DL
CHOLEST/HDLC SERPL: 3 {RATIO}
CO2 SERPL-SCNC: 29 MMOL/L
CREAT SERPL-MCNC: 0.8 MG/DL
DIFFERENTIAL METHOD: ABNORMAL
EOSINOPHIL # BLD AUTO: 0.1 K/UL
EOSINOPHIL NFR BLD: 0.9 %
ERYTHROCYTE [DISTWIDTH] IN BLOOD BY AUTOMATED COUNT: 15.6 %
EST. GFR  (AFRICAN AMERICAN): >60 ML/MIN/1.73 M^2
EST. GFR  (NON AFRICAN AMERICAN): >60 ML/MIN/1.73 M^2
FERRITIN SERPL-MCNC: 27 NG/ML
GLUCOSE SERPL-MCNC: 82 MG/DL
HCT VFR BLD AUTO: 36.9 %
HDLC SERPL-MCNC: 65 MG/DL
HDLC SERPL: 33.3 %
HGB BLD-MCNC: 11.7 G/DL
IMM GRANULOCYTES # BLD AUTO: 0.02 K/UL
IMM GRANULOCYTES NFR BLD AUTO: 0.2 %
IRON SERPL-MCNC: 116 UG/DL
LDLC SERPL CALC-MCNC: 99.4 MG/DL
LYMPHOCYTES # BLD AUTO: 4.3 K/UL
LYMPHOCYTES NFR BLD: 46.4 %
MCH RBC QN AUTO: 31 PG
MCHC RBC AUTO-ENTMCNC: 31.7 G/DL
MCV RBC AUTO: 98 FL
MONOCYTES # BLD AUTO: 0.7 K/UL
MONOCYTES NFR BLD: 7.8 %
NEUTROPHILS # BLD AUTO: 4.1 K/UL
NEUTROPHILS NFR BLD: 44.3 %
NONHDLC SERPL-MCNC: 130 MG/DL
NRBC BLD-RTO: 0 /100 WBC
PLATELET # BLD AUTO: 251 K/UL
PMV BLD AUTO: 11.9 FL
POTASSIUM SERPL-SCNC: 3.3 MMOL/L
PROT SERPL-MCNC: 7.4 G/DL
RBC # BLD AUTO: 3.77 M/UL
SATURATED IRON: 27 %
SODIUM SERPL-SCNC: 142 MMOL/L
T4 FREE SERPL-MCNC: 0.99 NG/DL
TOTAL IRON BINDING CAPACITY: 422 UG/DL
TRANSFERRIN SERPL-MCNC: 285 MG/DL
TRIGL SERPL-MCNC: 153 MG/DL
TSH SERPL DL<=0.005 MIU/L-ACNC: 1.2 UIU/ML
VIT B12 SERPL-MCNC: 634 PG/ML
WBC # BLD AUTO: 9.27 K/UL

## 2018-06-11 PROCEDURE — 82306 VITAMIN D 25 HYDROXY: CPT

## 2018-06-11 PROCEDURE — 99999 PR PBB SHADOW E&M-EST. PATIENT-LVL III: CPT | Mod: PBBFAC,,, | Performed by: FAMILY MEDICINE

## 2018-06-11 PROCEDURE — 80053 COMPREHEN METABOLIC PANEL: CPT

## 2018-06-11 PROCEDURE — 99397 PER PM REEVAL EST PAT 65+ YR: CPT | Mod: S$GLB,,, | Performed by: FAMILY MEDICINE

## 2018-06-11 PROCEDURE — 99214 OFFICE O/P EST MOD 30 MIN: CPT | Mod: 25,S$GLB,, | Performed by: FAMILY MEDICINE

## 2018-06-11 PROCEDURE — 3075F SYST BP GE 130 - 139MM HG: CPT | Mod: CPTII,S$GLB,, | Performed by: FAMILY MEDICINE

## 2018-06-11 PROCEDURE — 3078F DIAST BP <80 MM HG: CPT | Mod: CPTII,S$GLB,, | Performed by: FAMILY MEDICINE

## 2018-06-11 PROCEDURE — 84443 ASSAY THYROID STIM HORMONE: CPT

## 2018-06-11 PROCEDURE — 82728 ASSAY OF FERRITIN: CPT

## 2018-06-11 PROCEDURE — 84439 ASSAY OF FREE THYROXINE: CPT

## 2018-06-11 PROCEDURE — 82607 VITAMIN B-12: CPT

## 2018-06-11 PROCEDURE — 83540 ASSAY OF IRON: CPT

## 2018-06-11 PROCEDURE — 99499 UNLISTED E&M SERVICE: CPT | Mod: S$GLB,,, | Performed by: FAMILY MEDICINE

## 2018-06-11 PROCEDURE — 36415 COLL VENOUS BLD VENIPUNCTURE: CPT | Mod: PO

## 2018-06-11 PROCEDURE — 85025 COMPLETE CBC W/AUTO DIFF WBC: CPT

## 2018-06-11 PROCEDURE — 80061 LIPID PANEL: CPT

## 2018-06-11 NOTE — PROGRESS NOTES
Subjective:      Patient ID: Soren Gasca is a 84 y.o. female.    Chief Complaint: Follow-up (6m) and Annual Exam    Disclaimer:  This note is prepared using voice recognition software and as such is likely to have errors and has not been proof read. Please contact me for questions.       Patient's coming in today with her daughter for follow-up.  Since I last saw she reports fatigue.  However when asked she reports that she has also 84 years old.  Her daughter was interested in seeing if her iron levels might be low.  The currently only taking 1 tablet of iron a day.  Is having also a lot more episodes of diarrhea especially at night.  Seems to be going to the bathroom a good bit.  She reports that if she takes Imodium however it seems to improve.  We did discuss potentially if she is in need of iron then this may help some with the diarrhea as well.      she's doing much better from her leg cramps.  She started wearing her compression socks which have helped.  She reports her leg pain and leg swelling has improved dramatically.      She's also needing to obtain blood work as she has a history of a splenectomy and has a history of low platelets and anemia.  She's not currently taking iron at this time.  She's willing to do this today.    An anxiety standpoint she's needing a refill of her alprazolam.  She seems to do well with this mainly taking this at night help her to rest.      She also is on Aricept for dementia.  She's currently managed for this by Dr. Powers.    Is also complaining about lower back pain. From 2014 have an x-ray which shows some significant amounts of scoliosis that is present in the lumbosacral region.  Definitely affecting her left side as well.  She would be willing to go to physical therapy.  Daughter attends [facility] physical therapy.            Lab Results   Component Value Date    WBC 9.81 04/03/2018    HGB 11.6 (L) 04/03/2018    HCT 36.0 (L) 04/03/2018     04/03/2018    CHOL  "203 (H) 05/02/2017    TRIG 224 (H) 05/02/2017    HDL 56 05/02/2017    ALT 11 04/03/2018    AST 20 04/03/2018     04/03/2018    K 4.6 04/03/2018     04/03/2018    CREATININE 0.8 04/03/2018    BUN 17 04/03/2018    CO2 29 04/03/2018    TSH 1.490 05/02/2017    INR 1.0 12/03/2009    HGBA1C 5.8 05/02/2017       Review of Systems   Constitutional: Positive for fatigue. Negative for activity change, appetite change, chills and fever.   HENT: Negative for ear pain and trouble swallowing.    Eyes: Negative for pain and visual disturbance.   Respiratory: Negative for cough and shortness of breath.    Cardiovascular: Negative for chest pain and leg swelling.   Gastrointestinal: Positive for diarrhea. Negative for abdominal pain, blood in stool, nausea and vomiting.   Endocrine: Negative for cold intolerance and heat intolerance.   Genitourinary: Negative for dysuria and frequency.   Musculoskeletal: Positive for arthralgias and back pain. Negative for joint swelling, myalgias and neck pain.   Skin: Negative for color change and rash.   Neurological: Negative for dizziness and headaches.   Psychiatric/Behavioral: Negative for behavioral problems, decreased concentration, dysphoric mood and sleep disturbance. The patient is not nervous/anxious.      Objective:     Vitals:    06/11/18 1314   BP: 139/72   Pulse: 60   Temp: 98 °F (36.7 °C)   Weight: 57.2 kg (126 lb 1.7 oz)   Height: 5' 3" (1.6 m)     Physical Exam   Constitutional: She appears well-developed and well-nourished.   HENT:   Head: Normocephalic and atraumatic.   Right Ear: Tympanic membrane normal.   Left Ear: Tympanic membrane normal.   Mouth/Throat: Oropharynx is clear and moist.   Eyes: Conjunctivae and EOM are normal.   Neck: Normal range of motion. Neck supple.   Cardiovascular: Normal rate and regular rhythm.    Pulmonary/Chest: Effort normal and breath sounds normal.   Musculoskeletal:        Lumbar back: She exhibits decreased range of motion, " tenderness and bony tenderness.   Significant scoliosis   Psychiatric: She has a normal mood and affect. Her behavior is normal.   Nursing note and vitals reviewed.    Assessment:     1. Thrombocytopenia    2. Mild cognitive impairment with memory loss    3. Essential hypertension    4. H/O splenectomy    5. Calcification of aorta    6. Hyperlipidemia, unspecified hyperlipidemia type    7. Diarrhea, unspecified type    8. Vitamin D deficiency    9. Scoliosis of lumbosacral spine, unspecified scoliosis type    10. DDD (degenerative disc disease), lumbosacral      Plan:   Soren was seen today for follow-up and annual exam.    Diagnoses and all orders for this visit:    Thrombocytopenia-with history of splenectomy also with noted low iron in the past.  Checking lab work again today.  -     TSH; Future  -     T4, free; Future  -     Lipid panel; Future  -     CBC auto differential; Future  -     Iron and TIBC; Future  -     Ferritin; Future  -     Vitamin B12; Future  -     Comprehensive metabolic panel; Future  -     Vitamin D; Future    Mild cognitive impairment with memory loss-followed by Neurology continue with Aricept  -     TSH; Future  -     T4, free; Future  -     Lipid panel; Future  -     CBC auto differential; Future  -     Iron and TIBC; Future  -     Ferritin; Future  -     Vitamin B12; Future  -     Comprehensive metabolic panel; Future  -     Vitamin D; Future    Essential hypertension-stable at this time lab work today  -     TSH; Future  -     T4, free; Future  -     Lipid panel; Future  -     CBC auto differential; Future  -     Iron and TIBC; Future  -     Ferritin; Future  -     Vitamin B12; Future  -     Comprehensive metabolic panel; Future  -     Vitamin D; Future    H/O splenectomy-needing additional lab work  -     TSH; Future  -     T4, free; Future  -     Lipid panel; Future  -     CBC auto differential; Future  -     Iron and TIBC; Future  -     Ferritin; Future  -     Vitamin B12;  Future  -     Comprehensive metabolic panel; Future  -     Vitamin D; Future    Calcification of aorta-on statin therapy stable at this time  -     TSH; Future  -     T4, free; Future  -     Lipid panel; Future  -     CBC auto differential; Future  -     Iron and TIBC; Future  -     Ferritin; Future  -     Vitamin B12; Future  -     Comprehensive metabolic panel; Future  -     Vitamin D; Future    Hyperlipidemia, unspecified hyperlipidemia type -on statin therapy stable at this time  -     TSH; Future  -     T4, free; Future  -     Lipid panel; Future  -     CBC auto differential; Future  -     Iron and TIBC; Future  -     Ferritin; Future  -     Vitamin B12; Future  -     Comprehensive metabolic panel; Future  -     Vitamin D; Future    Diarrhea, unspecified type  Comments:  taking daily iron, may need to increase if low to help with anemia and diarrhea.  Also on Imodium.  Lab work today  Orders:  -     TSH; Future  -     T4, free; Future  -     Lipid panel; Future  -     CBC auto differential; Future  -     Iron and TIBC; Future  -     Ferritin; Future  -     Vitamin B12; Future  -     Comprehensive metabolic panel; Future  -     Vitamin D; Future    Vitamin D deficiency-schedule labs today  -     TSH; Future  -     T4, free; Future  -     Lipid panel; Future  -     CBC auto differential; Future  -     Iron and TIBC; Future  -     Ferritin; Future  -     Vitamin B12; Future  -     Comprehensive metabolic panel; Future  -     Vitamin D; Future    Scoliosis of lumbosacral spine, unspecified scoliosis type-noted extensively on x-rays recommend patient go ahead and proceed back with doing physical therapy to assist with back discomfort and additional issues.  -     Ambulatory Referral to Physical/Occupational Therapy    DDD (degenerative disc disease), lumbosacral -noted extensively on x-rays recommend patient go ahead and proceed back with doing physical therapy to assist with back discomfort and additional  issues.  -     Ambulatory Referral to Physical/Occupational Therapy            Follow-up in about 7 months (around 1/11/2019) for chronic issues Dr Jones.

## 2018-06-27 ENCOUNTER — PES CALL (OUTPATIENT)
Dept: ADMINISTRATIVE | Facility: CLINIC | Age: 83
End: 2018-06-27

## 2018-08-22 ENCOUNTER — OFFICE VISIT (OUTPATIENT)
Dept: NEUROLOGY | Facility: CLINIC | Age: 83
End: 2018-08-22
Payer: MEDICARE

## 2018-08-22 VITALS
HEIGHT: 63 IN | SYSTOLIC BLOOD PRESSURE: 158 MMHG | BODY MASS INDEX: 23.83 KG/M2 | WEIGHT: 134.5 LBS | DIASTOLIC BLOOD PRESSURE: 78 MMHG | HEART RATE: 62 BPM

## 2018-08-22 DIAGNOSIS — G31.84 MILD COGNITIVE IMPAIRMENT WITH MEMORY LOSS: Primary | Chronic | ICD-10-CM

## 2018-08-22 PROCEDURE — 99214 OFFICE O/P EST MOD 30 MIN: CPT | Mod: S$GLB,,, | Performed by: PSYCHIATRY & NEUROLOGY

## 2018-08-22 PROCEDURE — 3078F DIAST BP <80 MM HG: CPT | Mod: CPTII,S$GLB,, | Performed by: PSYCHIATRY & NEUROLOGY

## 2018-08-22 PROCEDURE — 99999 PR PBB SHADOW E&M-EST. PATIENT-LVL III: CPT | Mod: PBBFAC,,, | Performed by: PSYCHIATRY & NEUROLOGY

## 2018-08-22 PROCEDURE — 3077F SYST BP >= 140 MM HG: CPT | Mod: CPTII,S$GLB,, | Performed by: PSYCHIATRY & NEUROLOGY

## 2018-08-22 NOTE — PROGRESS NOTES
This is an 84-year-old patient who is accompanied to the clinic today by her daughter.  The patient's daughter indicates that the memory loss previously documented has seemed to be slightly worsening with time.  This is primarily a loss of recent memory for events that occur from day-to-day are within the day.  The patient has remained independent in her home environment and is independent for instrumental activities of daily living.  The patient's daughter indicates however that the patient does not perform any cooking activities.  She is able to do simple household activities without significant difficulty and is under constant supervision.  There is a granddaughter who is living with her and spends the night there in the home.    The daughter and the family have now obtained a power-of- and medical power of .  The daughter indicates that she is managing and supervising her mother's finances by paying the bills and monitoring income.  The daughter has not noted any active visual or auditory hallucination.  The patient has been safe in her home environment and has not had any indication of falls or near falls.  The patient has not shown any evidence of weakness, awkwardness, or clumsiness of the extremities. The patient indicates that she has an occasional mild sinus headache that is easily treated with over-the-counter medication.  Her weight has been stable.  She is sleeping through the night without nocturnal wandering.  There is no indication of sundowning.      ROS:  GENERAL: No fever, chills, fatigability or weight loss.  SKIN: No rashes, itching or changes in color or texture of skin.  HEAD: No headaches or recent head trauma.  EYES: Visual acuity fine. No photophobia, ocular pain or diplopia.  EARS: Denies ear pain, discharge or vertigo.  NOSE: No loss of smell, no epistaxis or postnasal drip.  MOUTH & THROAT: No hoarseness or change in voice. No excessive gum bleeding.  NODES: Denies  swollen glands.  CHEST: Denies BETANCUR, cyanosis, wheezing, cough and sputum production.  CARDIOVASCULAR: Denies chest pain, PND, orthopnea or reduced exercise tolerance.  ABDOMEN: Appetite fine. No weight loss. Denies diarrhea, abdominal pain, hematemesis or blood in stool.  URINARY: No flank pain, dysuria or hematuria.  PERIPHERAL VASCULAR: No claudication or cyanosis.  MUSCULOSKELETAL: No joint stiffness or swelling. Denies back pain.  NEUROLOGIC: No history of seizures, paralysis, alteration of gait or coordination.    The patient's past history, family history and social history were reviewed with the patient's daughter and the medical records.  No changes are made.    PE:   VITAL SIGNS:  Blood pressure 158/78, pulse 62, weight 61 kg, height 5 ft 3 in, BMI 23.82  APPEARANCE: Well nourished, well developed, in no acute distress.  The patient is extremely well dressed and very clean.  HEAD: Normocephalic, atraumatic.  EYES: PERRL. EOMI.  Non-icteric sclerae.    EARS: TM's intact. Light reflex normal. No retraction or perforation.    NOSE: Mucosa pink. Airway clear.  MOUTH & THROAT: No tonsillar enlargement. No pharyngeal erythema or exudate. No stridor.  NECK: Supple. No bruits.  CHEST: Lungs clear to auscultation.  CARDIOVASCULAR: Regular rhythm without significant murmurs.  ABDOMEN: Bowel sounds normal. Not distended  MUSCULOSKELETAL:  No bony deformity seen.  Muscle tone and muscle mass are normal in both upper and lower extremities.  NEUROLOGIC:   Mental Status:  The patient was oriented to location and month but could not name the day or date.  She knew that her birthday was coming within the next 2 or 3 weeks however.  She was able to follow one-step commands without difficulty.  Two step commands required frequent verbal cues and suggestions.  Her speech is very fluent and articulate without difficulty with word finding.  The patient is attentive to the environment and cooperative for the exam.  Cranial  Nerves: II-XII grossly intact. Fundoscopic exam is normal.  No hemorrhage, exudate or papilledema is present. The extraocular muscles are intact in the cardinal directions of gaze.  No ptosis is present. Facial features are symmetrical.  Speech is normal in fluency, diction, and phrasing.  Tongue protrudes in the midline.    Gait and Station:  Romberg is negative.  Good alternate armswing with normal gait.  Motor:  No downdrift of either arm when held at shoulder level.  Manual muscle testing of proximal and distal muscles of both upper and lower extremities is normal.   Sensory:  Intact both upper and lower extremities to pin prick, touch, and vibration.  Cerebellar:  Finger to nose done well.  Alternating movements intact.  No involuntary movements or tremor seen.  Reflexes:  Stretch reflexes are 2+ both upper and lower extremities.  Plantar stimulation is flexor bilaterally and no pathological reflexes are seen     Assessment:  1.  Mild cognitive impairment with memory loss  2.  Essential hypertension  3.  Hyperlipidemia    Recommendations  1.  Continue Aricept 10 mg once a day.  2.  Continue other medications as previously prescribed  3.  Routine follow-up with this office in 6 months.    This was a 35 min visit with the patient and the daughter with over 50% of the time spent counseling the daughter and the patient regarding the significance of mild cognitive impairment and its relationship to development of dementia      This note is generated with speech recognition software and is subject to transcription error and sound alike phrases that may be missed by proofreading.

## 2018-09-17 RX ORDER — FUROSEMIDE 20 MG/1
20 TABLET ORAL DAILY
Qty: 90 TABLET | Refills: 2 | Status: SHIPPED | OUTPATIENT
Start: 2018-09-17 | End: 2019-01-15

## 2018-09-17 RX ORDER — ALPRAZOLAM 0.25 MG/1
TABLET ORAL
Qty: 90 TABLET | Refills: 1 | Status: SHIPPED | OUTPATIENT
Start: 2018-09-17 | End: 2019-01-15

## 2018-10-24 ENCOUNTER — IMMUNIZATION (OUTPATIENT)
Dept: INTERNAL MEDICINE | Facility: CLINIC | Age: 83
End: 2018-10-24
Payer: MEDICARE

## 2018-10-24 PROCEDURE — 90662 IIV NO PRSV INCREASED AG IM: CPT | Mod: PBBFAC,HCNC,PO

## 2018-11-05 ENCOUNTER — TELEPHONE (OUTPATIENT)
Dept: INTERNAL MEDICINE | Facility: CLINIC | Age: 83
End: 2018-11-05

## 2018-11-05 NOTE — TELEPHONE ENCOUNTER
----- Message from Elba Soliz sent at 11/5/2018 12:19 PM CST -----  Contact: Pat/daughter  Pat called to speak with the nurse about the appointment that was scheduled for tomorrow; she might have to reschedule because her granddaughter has an appointment around that same time. She can be contacted at [phone].    Thanks,  Elba

## 2018-11-05 NOTE — TELEPHONE ENCOUNTER
----- Message from Yesika Martins sent at 11/5/2018  1:01 PM CST -----  Contact: Patients daughter, Pat Stewart is returning a call, please call her back at [phone]. Thank you

## 2018-11-05 NOTE — TELEPHONE ENCOUNTER
----- Message from Maggy Hutchins sent at 11/2/2018  4:51 PM CDT -----  Contact: pt daughter Pat  Calling in regards to being worked into the schedule for loose bowels and please advise [phone]

## 2018-11-06 ENCOUNTER — OFFICE VISIT (OUTPATIENT)
Dept: INTERNAL MEDICINE | Facility: CLINIC | Age: 83
End: 2018-11-06
Payer: MEDICARE

## 2018-11-06 ENCOUNTER — HOSPITAL ENCOUNTER (OUTPATIENT)
Dept: RADIOLOGY | Facility: HOSPITAL | Age: 83
Discharge: HOME | End: 2018-11-06
Attending: FAMILY MEDICINE
Payer: MEDICARE

## 2018-11-06 VITALS
TEMPERATURE: 97 F | WEIGHT: 134.5 LBS | HEIGHT: 63 IN | BODY MASS INDEX: 23.83 KG/M2 | DIASTOLIC BLOOD PRESSURE: 70 MMHG | SYSTOLIC BLOOD PRESSURE: 130 MMHG | HEART RATE: 64 BPM

## 2018-11-06 DIAGNOSIS — M54.50 LOW BACK PAIN, NON-SPECIFIC: ICD-10-CM

## 2018-11-06 DIAGNOSIS — R15.9 INCONTINENCE OF FECES, UNSPECIFIED FECAL INCONTINENCE TYPE: ICD-10-CM

## 2018-11-06 DIAGNOSIS — R19.7 DIARRHEA, UNSPECIFIED TYPE: ICD-10-CM

## 2018-11-06 DIAGNOSIS — R19.7 DIARRHEA, UNSPECIFIED TYPE: Primary | ICD-10-CM

## 2018-11-06 DIAGNOSIS — M41.9 SCOLIOSIS OF LUMBOSACRAL SPINE, UNSPECIFIED SCOLIOSIS TYPE: ICD-10-CM

## 2018-11-06 PROCEDURE — 72100 X-RAY EXAM L-S SPINE 2/3 VWS: CPT | Mod: TC,FY,HCNC,PO

## 2018-11-06 PROCEDURE — 1101F PT FALLS ASSESS-DOCD LE1/YR: CPT | Mod: CPTII,HCNC,S$GLB, | Performed by: FAMILY MEDICINE

## 2018-11-06 PROCEDURE — 74019 RADEX ABDOMEN 2 VIEWS: CPT | Mod: TC,FY,HCNC,PO

## 2018-11-06 PROCEDURE — 3078F DIAST BP <80 MM HG: CPT | Mod: CPTII,HCNC,S$GLB, | Performed by: FAMILY MEDICINE

## 2018-11-06 PROCEDURE — 72100 X-RAY EXAM L-S SPINE 2/3 VWS: CPT | Mod: 26,HCNC,, | Performed by: RADIOLOGY

## 2018-11-06 PROCEDURE — 99499 UNLISTED E&M SERVICE: CPT | Mod: S$GLB,,, | Performed by: FAMILY MEDICINE

## 2018-11-06 PROCEDURE — 3075F SYST BP GE 130 - 139MM HG: CPT | Mod: CPTII,HCNC,S$GLB, | Performed by: FAMILY MEDICINE

## 2018-11-06 PROCEDURE — 74019 RADEX ABDOMEN 2 VIEWS: CPT | Mod: 26,HCNC,, | Performed by: RADIOLOGY

## 2018-11-06 PROCEDURE — 99999 PR PBB SHADOW E&M-EST. PATIENT-LVL IV: CPT | Mod: PBBFAC,HCNC,, | Performed by: FAMILY MEDICINE

## 2018-11-06 PROCEDURE — 99214 OFFICE O/P EST MOD 30 MIN: CPT | Mod: HCNC,S$GLB,, | Performed by: FAMILY MEDICINE

## 2018-11-06 NOTE — PATIENT INSTRUCTIONS
Cut out milk and ice cream,. Change to lactaid and sherbert or gelato for 2 weeks. If not better, then inform me to do ct abdomen and pelvis.   Uncertain Causes of Diarrhea (Adult)    Diarrhea is when stools are loose and watery. This can be caused by:  · Viral infections  · Bacterial infections  · Food poisoning  · Parasites  · Irritable bowel syndrome (IBS)  · Inflammatory bowel diseases such as ulcerative colitis, Crohn's disease, and celiac disease  · Food intolerance, such as to lactose, the sugar found in milk and milk products  · Reaction to medicines like antibiotics, laxatives, cancer drugs, and antacids  Along with diarrhea, you may also have:  · Abdominal pain and cramping  · Nausea and vomiting  · Loss of bowel control  · Fever and chills  · Bloody stools  In some cases, antibiotics may help to treat diarrhea. You may have a stool sample test. This is done to see what is causing your diarrhea, and if antibiotics will help treat it. The results of a stool sample test may take up to 2 days. The healthcare provider may not give you antibiotics until he or she has the stool test results.  Diarrhea can cause dehydration. This is the loss of too much water and other fluids from the body. When this occurs, body fluid must be replaced. This can be done with oral rehydration solutions. Oral rehydration solutions are available at drugstores and grocery stores without a prescription.  Home care  Follow all instructions given by your healthcare provider. Rest at home for the next 24 hours, or until you feel better. Avoid caffeine, tobacco, and alcohol. These can make diarrhea, cramping, and pain worse.  If taking medicines:  · Dont take over-the-counter diarrhea or nausea medicines unless your healthcare provider tells you to.  · You may use acetaminophen or NSAID medicines like ibuprofen or naproxen to reduce pain and fever. Dont use these if you have chronic liver or kidney disease, or ever had a stomach ulcer  or gastrointestinal bleeding. Don't use NSAID medicines if you are already taking one for another condition (like arthritis) or are on daily aspirin therapy (such as for heart disease or after a stroke). Talk with your healthcare provider first.  · If antibiotics were prescribed, be sure you take them until they are finished. Dont stop taking them even when you feel better. Antibiotics must be taken as a full course.  To prevent the spread of illness:  · Remember that washing with soap and water and using alcohol-based  is the best way to prevent the spread of infection.  · Clean the toilet after each use.  · Wash your hands before eating.  · Wash your hands before and after preparing food. Keep in mind that people with diarrhea or vomiting should not prepare food for others.  · Wash your hands after using cutting boards, countertops, and knives that have been in contact with raw foods.  · Wash and then peel fruits and vegetables.  · Keep uncooked meats away from cooked and ready-to-eat foods.  · Use a food thermometer when cooking. Cook poultry to at least 165°F (74°C). Cook ground meat (beef, veal, pork, lamb) to at least 160°F (71°C). Cook fresh beef, veal, lamb, and pork to at least 145°F (63°C).  · Dont eat raw or undercooked eggs (poached or tejinder side up), poultry, meat, or unpasteurized milk and juices.  Food and drinks  The main goal while treating vomiting or diarrhea is to prevent dehydration. This is done by taking small amounts of liquids often.  · Keep in mind that liquids are more important than food right now.  · Drink only small amounts of liquids at a time.  · Dont force yourself to eat, especially if you are having cramping, vomiting, or diarrhea. Dont eat large amounts at a time, even if you are hungry.  · If you eat, avoid fatty, greasy, spicy, or fried foods.  · Dont eat dairy foods or drink milk if you have diarrhea. These can make diarrhea worse.  During the first 24 hours you  can try:  · Oral rehydration solutions. Do not use sports drinks. They have too much sugar and not enough electrolytes.  · Soft drinks without caffeine  · Ginger ale  · Water (plain or flavored)  · Decaf tea or coffee  · Clear broth, consommé, or bouillon  · Gelatin, popsicles, or frozen fruit juice bars  The second 24 hours, if you are feeling better, you can add:  · Hot cereal, plain toast, bread, rolls, or crackers  · Plain noodles, rice, mashed potatoes, chicken noodle soup, or rice soup  · Unsweetened canned fruit (no pineapple)  · Bananas  As you recover:  · Limit fat intake to less than 15 grams per day. Dont eat margarine, butter, oils, mayonnaise, sauces, gravies, fried foods, peanut butter, meat, poultry, or fish.  · Limit fiber. Dont eat raw or cooked vegetables, fresh fruits except bananas, or bran cereals.  · Limit caffeine and chocolate.  · Limit dairy.  · Dont use spices or seasonings except salt.  · Go back to your normal diet over time, as you feel better and your symptoms improve.  · If the symptoms come back, go back to a simple diet or clear liquids.  Follow-up care  Follow up with your healthcare provider, or as advised. If a stool sample was taken or cultures were done, call the healthcare provider for the results as instructed.  Call 911  Call 911 if you have any of these symptoms:  · Trouble breathing  · Confusion  · Extreme drowsiness or trouble walking  · Loss of consciousness  · Rapid heart rate  · Chest pain  · Stiff neck  · Seizure  When to seek medical advice  Call your healthcare provider right away if any of these occur:  · Abdominal pain that gets worse  · Constant lower right abdominal pain  · Continued vomiting and inability to keep liquids down  · Diarrhea more than 5 times a day  · Blood in vomit or stool  · Dark urine or no urine for 8 hours, dry mouth and tongue, tiredness, weakness, or dizziness  · Drowsiness  · New rash  · You dont get better in 2 to 3 days  · Fever of  100.4°F (38°C) or higher that doesnt get lower with medicine  Date Last Reviewed: 1/3/2016  © 9745-7564 [employer]. [address] [place] [address], [place] [address]. All rights reserved. This information is not intended as a substitute for professional medical care. Always follow your healthcare professional's instructions.        Treating Diarrhea    Diarrhea happens when you have loose, watery, or frequent bowel movements. It is a common problem with many causes. Most cases of diarrhea clear up on their own. But certain cases may need treatment. Be sure to see your healthcare provider if your symptoms do not improve within a few days.  Getting relief  Treatment of diarrhea depends on its cause. Diarrhea caused by bacterial or parasite infection is often treated with antibiotics. Diarrhea caused by other factors, such as a stomach virus, often improves with simple home treatment. The tips below may also help relieve your symptoms.  · Drink plenty of fluids. This helps prevent too much fluid loss (dehydration). Water, clear soups, and electrolyte solutions are good choices. Avoid alcohol, coffee, tea, and milk. These can irritate your intestines and make symptoms worse.  · Suck on ice chips if drinking makes you queasy.  · Return to your normal diet slowly. You may want to eat bland foods at first, such as rice and toast. Also, you may need to avoid certain foods for a while, such as dairy products. These can make symptoms worse. Ask your healthcare provider if there are any other foods you should avoid.  · If you were prescribed antibiotics, take them as directed.  · Do not take anti-diarrhea medicines without asking your healthcare provider first.  Call your healthcare provider   Call your healthcare provider if you have any of the following:   · A fever of 100.4°F (38.0°C) or higher, or as directed by your healthcare provider  · Severe pain  · Worsening diarrhea or diarrhea for more than 2 days  · Bloody  vomit or stool  · Signs of dehydration (dizziness, dry mouth and tongue, rapid pulse, dark urine)  Date Last Reviewed: 7/1/2016 © 2000-2017 [employer]. [address] [place] [address], [place] [address]. All rights reserved. This information is not intended as a substitute for professional medical care. Always follow your healthcare professional's instructions.        Low-Fiber Diet     Eggs are high in protein and easy to digest.     Eating a low-fiber diet means eating foods that dont have much fiber. These foods are easy to digest.  Most of the fiber that you eat passes undigested through your bowel. This is what forms stool. Low-fiber foods can help to slow down your bowel movements. When you eat a low-fiber diet, you have fewer stools. This lets your intestine rest.  Your healthcare provider will tell you how long you need to be on this diet. It may only be for a short time. Low-fiber foods often don't give you all the nutrients you need to keep healthy. Your provider may have you take certain vitamins while you are on this diet.  Reasons to eat a low-fiber diet  The goal of a low-fiber diet is to limit the size and number of your stools. It may be prescribed if you:  · Are having chemotherapy or radiation treatments  · Have had intestinal surgery  · Have a condition that affects your intestine, such as irritable bowel syndrome, Crohns disease, ulcerative colitis, or diverticulitis  General guidelines for a low-fiber diet  In general, a low-fiber diet means having fewer than 13 grams of fiber a day. Your provider may give you a list of things you can and cant eat or drink. Read food labels. Choose foods and drinks that have as close to zero grams of fiber as possible. Here are general guidelines to follow:  Breads, pasta, cereal, rice, and other starches (6 to 11 servings daily)  · What to choose: white bread, biscuits, muffins, and white rolls; plain crackers; waffles; white pasta; white rice; cream  of wheat; grits; white pancakes; corn flakes; cooked potatoes without skin.  Fiber content of these foods should be less than 0.5 (1/2) gram per serving.  · What to avoid: whole-wheat or whole-grain breads, crackers, and pasta; breads with seeds or nuts; wheat germ; angeles crackers; cornbread; wild or brown rice; whole-grain, bran, and granola cereals; cereals with seeds, nuts, coconut, or dried fruit; potatoes with skin  Milk and dairy (2 servings daily)  · What to choose: milk, buttermilk; yogurt or ice cream without seeds or nuts; custard or pudding; sour cream; cheese and cottage cheese  · What to avoid: ice cream and yogurt with seeds, nuts, or fruit chunks  Fruit (2 to 4 servings daily)  · What to choose: ripe banana; ripe nectarine, peach, apricot, papaya, and plum; soft honeydew melon and cantaloupe; cooked or canned fruit without skin or seeds (not sweetened with sorbitol); applesauce; strained fruit juice (without pulp)  · What to avoid: raw or dried fruit; all berries; raisins; canned and raw pineapple; prunes and prune juice; fruit juice with pulp  Vegetables (3 to 5 servings daily)  · What to choose: well-cooked or canned vegetables without seeds, such as spinach, eggplant, green and wax beans, carrots, yellow squash, pumpkin; lettuce on a sandwich  · What to avoid: all raw or steamed vegetables; vegetables with seeds, such as unstrained tomato sauce; green peas; lima beans; broccoli; corn; parsnips  Meats and protein (4 to 6 ounces daily)  · What to choose: tender, well-cooked meat, including ground meat, poultry, and fish; eggs; tofu; creamy peanut butter  · What to avoid: tough, chewy meat with gristle; peas, including split, yellow, and black-eyed; beans, including navy, lima, black, garbanzo, soy, murcia, and lentil; peanuts and crunchy peanut butter   Fats, oils, sauces, condiments (fewer than 8 teaspoons daily)  · What to choose: butter, magarine, oils, whipped cream, sour cream, mayonnaise,  smooth dressings and sauces; plain gravy; smooth condiments  · What to avoid: dressing with seeds or fruit chunks; pickles and relishes  Other foods and drinks  · What to choose: water; plain gelatin; plain puddings; pretzels; plain cookies and cakes; honey, syrup; decaffeinated drinks, including tea and coffee    · What to avoid: popcorn; potato chips; spicy foods; fried, greasy foods; alcohol (ask your provider); marmalade, jam, and preserves; desserts that have seeds, nuts, coconut, dried fruit, whole grains or bran; candy that has seeds or nuts; drinks sweetened with sorbitol or other sugar substitutes; caffeinated drinks, including tea, coffee, soda, and energy drinks  Date Last Reviewed: 6/18/2015  © 6405-9698 [employer]. [address] [place] [address], [place] [address]. All rights reserved. This information is not intended as a substitute for professional medical care. Always follow your healthcare professional's instructions.

## 2018-11-06 NOTE — Clinical Note
Please set up appointment for pain interventionalist due to x-ray findings with these significant scoliosis but no compression fractures.Monica Jones MD

## 2018-11-06 NOTE — PROGRESS NOTES
Subjective:      Patient ID: Soren Gasca is a 85 y.o. female.    Chief Complaint: Diarrhea and Back Pain    Disclaimer:  This note is prepared using voice recognition software and as such is likely to have errors and has not been proof read. Please contact me for questions.     Pt here for multiple issues. Having loose bowels and diarrhea and affecting her at night also. Getting more often and more uncontrolling. Happening at night while sleeping as well and can't get up to get to bathroom in time. One night in bed this week. No cramping. No fevers. No new medications. Eating some milk and bread at night. Occasionally with cereal at night. By herself and soemtimes will  for lunch and then eat on it for lunch and supper.  Also eating some yogurt.  Has had visit previously for black tarry stools back in April and had a KUB at that time.  But still testing was negative for blood.  At that time was also taking iron.  Not currently taking that.    Having back pain. Does have known significant evidence of lumbar scoliosis.  Has a lot of curvature noted from the x-rays from 2014.  Previously tried physical therapy but really just in find that it helped much.  Can occasionally use heat.  Daughter is interested her possibly doing chiropractor but she is not.  Back pains at 8/10.  No radiculopathy symptoms but hurts at times just to stand.  Does not hurt to lay down.  No trauma recently but does have concerns for possible osteoporosis and potential for compression fracture.        Lab Results   Component Value Date    WBC 9.27 06/11/2018    HGB 11.7 (L) 06/11/2018    HCT 36.9 (L) 06/11/2018     06/11/2018    CHOL 195 06/11/2018    TRIG 153 (H) 06/11/2018    HDL 65 06/11/2018    ALT 10 06/11/2018    AST 17 06/11/2018     06/11/2018    K 3.3 (L) 06/11/2018     06/11/2018    CREATININE 0.8 06/11/2018    BUN 13 06/11/2018    CO2 29 06/11/2018    TSH 1.201 06/11/2018    INR 1.0 12/03/2009    HGBA1C 5.8  05/02/2017       X-Ray Abdomen Flat And Erect  Narrative: EXAMINATION:  XR ABDOMEN FLAT AND ERECT    CLINICAL HISTORY:  Diarrhea, unspecified    TECHNIQUE:  Flat and erect AP views of the abdomen were performed.    COMPARISON:  04/03/2018    FINDINGS:  Bowel gas pattern is nonobstructive.  No suspicious mass-effect or calcifications present.  Lumbar spine and bilateral hips degenerative findings present.  Lung bases clear.  Impression: Nonspecific nonobstructive abdomen.    Electronically signed by: Tanner Alonso MD  Date:    11/06/2018  Time:    13:48  X-Ray Lumbar Spine 2 Or 3 Views  Narrative: EXAMINATION:  XR LUMBAR SPINE 2 OR 3 VIEWS    CLINICAL HISTORY:  Low back pain, risk factors (osteoporosis or chronic steroid use or elderly);worsening back pain, known solioisis rule out compression fracture;  Low back pain    COMPARISON:  February 4, 2014    FINDINGS:  Prominent dextroscoliosis noted, similar to prior study.  Multilevel degenerative disc height loss and left lateral osteophytes noted.  Vertebral body heights unchanged.  Grade 1 anterolisthesis of L4 on L5 present.  Prominent multilevel facet arthropathy noted.  Prominent aortic calcification present without aneurysmal dilatation.  Impression: Prominent multilevel degenerative findings.    Electronically signed by: Tanner Alonso MD  Date:    11/06/2018  Time:    13:47        Review of Systems   Constitutional: Positive for activity change. Negative for appetite change and fatigue.   Gastrointestinal: Positive for diarrhea. Negative for abdominal distention, abdominal pain, anal bleeding, blood in stool, constipation, nausea, rectal pain and vomiting.   Musculoskeletal: Positive for arthralgias, back pain and myalgias.   Psychiatric/Behavioral: Negative for sleep disturbance. The patient is not nervous/anxious.      Objective:     Vitals:    11/06/18 1226   BP: 130/70   Pulse: 64   Temp: 97 °F (36.1 °C)   TempSrc: Tympanic   Weight: 61 kg (134 lb  "7.7 oz)   Height: 5' 3" (1.6 m)     Physical Exam   Constitutional: She appears well-developed and well-nourished.   Abdominal: Soft. Normal appearance and bowel sounds are normal. There is no tenderness. There is no rigidity, no rebound, no guarding and no CVA tenderness.   Musculoskeletal:        Lumbar back: She exhibits decreased range of motion, tenderness, bony tenderness, pain and spasm.        Back:    Vitals reviewed.    Assessment:     1. Diarrhea, unspecified type    2. Incontinence of feces, unspecified fecal incontinence type    3. Scoliosis of lumbosacral spine, unspecified scoliosis type    4. Low back pain, non-specific      Plan:   Soren was seen today for diarrhea and back pain.    Diagnoses and all orders for this visit:    Diarrhea, unspecified type-ongoing now with some fecal incontinence.  Suspect it may be related to milk products and lactose.  At this time we discussed about changing her diet some.  Did do x-rays to rule out any obstruction.  If it is not better at that time then will also obtain stool studies as well as scheduling CT scan of the abdomen pelvis.  The willing to do so.  Comments:  cut out milk and ice cream, change to lactaid and sherbert for 1 weeks. if not better, then ct scan abdomen/pelvis. can do stool studies also.   Orders:  -     X-Ray Abdomen Flat And Erect; Future  -     Stool culture; Future  -     Stool Exam-Ova,Cysts,Parasites; Future    Incontinence of feces, unspecified fecal incontinence type-eliminate milk at this time consider switching over to sherberts, gelatos, and Lactaid milk.  -     X-Ray Abdomen Flat And Erect; Future  -     Stool culture; Future  -     Stool Exam-Ova,Cysts,Parasites; Future    Scoliosis of lumbosacral spine, unspecified scoliosis type  Comments:  xray today, failed PT, rule out compression fx, consider pain intervention vs chiropractor. ok for heat.  X-rays reviewed today.  No evidence of compression fracture but does have some " anterior listhesis that may be causing some of her further discomfort.  Recommend that she could do either physical therapy or chiropractor or even pain interventionalist.    Low back pain, non-specific-did advise on stretching exercises and heat.  -     Cancel: X-Ray Lumbar Complete With Flex And Ext; Future            Follow-up if symptoms worsen or fail to improve.    Patient Instructions     Cut out milk and ice cream,. Change to lactaid and sherbert or gelato for 2 weeks. If not better, then inform me to do ct abdomen and pelvis.   Uncertain Causes of Diarrhea (Adult)    Diarrhea is when stools are loose and watery. This can be caused by:  · Viral infections  · Bacterial infections  · Food poisoning  · Parasites  · Irritable bowel syndrome (IBS)  · Inflammatory bowel diseases such as ulcerative colitis, Crohn's disease, and celiac disease  · Food intolerance, such as to lactose, the sugar found in milk and milk products  · Reaction to medicines like antibiotics, laxatives, cancer drugs, and antacids  Along with diarrhea, you may also have:  · Abdominal pain and cramping  · Nausea and vomiting  · Loss of bowel control  · Fever and chills  · Bloody stools  In some cases, antibiotics may help to treat diarrhea. You may have a stool sample test. This is done to see what is causing your diarrhea, and if antibiotics will help treat it. The results of a stool sample test may take up to 2 days. The healthcare provider may not give you antibiotics until he or she has the stool test results.  Diarrhea can cause dehydration. This is the loss of too much water and other fluids from the body. When this occurs, body fluid must be replaced. This can be done with oral rehydration solutions. Oral rehydration solutions are available at drugstores and grocery stores without a prescription.  Home care  Follow all instructions given by your healthcare provider. Rest at home for the next 24 hours, or until you feel better. Avoid  caffeine, tobacco, and alcohol. These can make diarrhea, cramping, and pain worse.  If taking medicines:  · Dont take over-the-counter diarrhea or nausea medicines unless your healthcare provider tells you to.  · You may use acetaminophen or NSAID medicines like ibuprofen or naproxen to reduce pain and fever. Dont use these if you have chronic liver or kidney disease, or ever had a stomach ulcer or gastrointestinal bleeding. Don't use NSAID medicines if you are already taking one for another condition (like arthritis) or are on daily aspirin therapy (such as for heart disease or after a stroke). Talk with your healthcare provider first.  · If antibiotics were prescribed, be sure you take them until they are finished. Dont stop taking them even when you feel better. Antibiotics must be taken as a full course.  To prevent the spread of illness:  · Remember that washing with soap and water and using alcohol-based  is the best way to prevent the spread of infection.  · Clean the toilet after each use.  · Wash your hands before eating.  · Wash your hands before and after preparing food. Keep in mind that people with diarrhea or vomiting should not prepare food for others.  · Wash your hands after using cutting boards, countertops, and knives that have been in contact with raw foods.  · Wash and then peel fruits and vegetables.  · Keep uncooked meats away from cooked and ready-to-eat foods.  · Use a food thermometer when cooking. Cook poultry to at least 165°F (74°C). Cook ground meat (beef, veal, pork, lamb) to at least 160°F (71°C). Cook fresh beef, veal, lamb, and pork to at least 145°F (63°C).  · Dont eat raw or undercooked eggs (poached or tejinder side up), poultry, meat, or unpasteurized milk and juices.  Food and drinks  The main goal while treating vomiting or diarrhea is to prevent dehydration. This is done by taking small amounts of liquids often.  · Keep in mind that liquids are more important than  food right now.  · Drink only small amounts of liquids at a time.  · Dont force yourself to eat, especially if you are having cramping, vomiting, or diarrhea. Dont eat large amounts at a time, even if you are hungry.  · If you eat, avoid fatty, greasy, spicy, or fried foods.  · Dont eat dairy foods or drink milk if you have diarrhea. These can make diarrhea worse.  During the first 24 hours you can try:  · Oral rehydration solutions. Do not use sports drinks. They have too much sugar and not enough electrolytes.  · Soft drinks without caffeine  · Ginger ale  · Water (plain or flavored)  · Decaf tea or coffee  · Clear broth, consommé, or bouillon  · Gelatin, popsicles, or frozen fruit juice bars  The second 24 hours, if you are feeling better, you can add:  · Hot cereal, plain toast, bread, rolls, or crackers  · Plain noodles, rice, mashed potatoes, chicken noodle soup, or rice soup  · Unsweetened canned fruit (no pineapple)  · Bananas  As you recover:  · Limit fat intake to less than 15 grams per day. Dont eat margarine, butter, oils, mayonnaise, sauces, gravies, fried foods, peanut butter, meat, poultry, or fish.  · Limit fiber. Dont eat raw or cooked vegetables, fresh fruits except bananas, or bran cereals.  · Limit caffeine and chocolate.  · Limit dairy.  · Dont use spices or seasonings except salt.  · Go back to your normal diet over time, as you feel better and your symptoms improve.  · If the symptoms come back, go back to a simple diet or clear liquids.  Follow-up care  Follow up with your healthcare provider, or as advised. If a stool sample was taken or cultures were done, call the healthcare provider for the results as instructed.  Call 911  Call 911 if you have any of these symptoms:  · Trouble breathing  · Confusion  · Extreme drowsiness or trouble walking  · Loss of consciousness  · Rapid heart rate  · Chest pain  · Stiff neck  · Seizure  When to seek medical advice  Call your healthcare provider  right away if any of these occur:  · Abdominal pain that gets worse  · Constant lower right abdominal pain  · Continued vomiting and inability to keep liquids down  · Diarrhea more than 5 times a day  · Blood in vomit or stool  · Dark urine or no urine for 8 hours, dry mouth and tongue, tiredness, weakness, or dizziness  · Drowsiness  · New rash  · You dont get better in 2 to 3 days  · Fever of 100.4°F (38°C) or higher that doesnt get lower with medicine  Date Last Reviewed: 1/3/2016  © 0515-6481 [employer]. [address] [place] [address]. All rights reserved. This information is not intended as a substitute for professional medical care. Always follow your healthcare professional's instructions.        Treating Diarrhea    Diarrhea happens when you have loose, watery, or frequent bowel movements. It is a common problem with many causes. Most cases of diarrhea clear up on their own. But certain cases may need treatment. Be sure to see your healthcare provider if your symptoms do not improve within a few days.  Getting relief  Treatment of diarrhea depends on its cause. Diarrhea caused by bacterial or parasite infection is often treated with antibiotics. Diarrhea caused by other factors, such as a stomach virus, often improves with simple home treatment. The tips below may also help relieve your symptoms.  · Drink plenty of fluids. This helps prevent too much fluid loss (dehydration). Water, clear soups, and electrolyte solutions are good choices. Avoid alcohol, coffee, tea, and milk. These can irritate your intestines and make symptoms worse.  · Suck on ice chips if drinking makes you queasy.  · Return to your normal diet slowly. You may want to eat bland foods at first, such as rice and toast. Also, you may need to avoid certain foods for a while, such as dairy products. These can make symptoms worse. Ask your healthcare provider if there are any other foods you should avoid.  · If you  were prescribed antibiotics, take them as directed.  · Do not take anti-diarrhea medicines without asking your healthcare provider first.  Call your healthcare provider   Call your healthcare provider if you have any of the following:   · A fever of 100.4°F (38.0°C) or higher, or as directed by your healthcare provider  · Severe pain  · Worsening diarrhea or diarrhea for more than 2 days  · Bloody vomit or stool  · Signs of dehydration (dizziness, dry mouth and tongue, rapid pulse, dark urine)  Date Last Reviewed: 7/1/2016 © 2000-2017 [employer]. [address] [place] [address]. All rights reserved. This information is not intended as a substitute for professional medical care. Always follow your healthcare professional's instructions.        Low-Fiber Diet     Eggs are high in protein and easy to digest.     Eating a low-fiber diet means eating foods that dont have much fiber. These foods are easy to digest.  Most of the fiber that you eat passes undigested through your bowel. This is what forms stool. Low-fiber foods can help to slow down your bowel movements. When you eat a low-fiber diet, you have fewer stools. This lets your intestine rest.  Your healthcare provider will tell you how long you need to be on this diet. It may only be for a short time. Low-fiber foods often don't give you all the nutrients you need to keep healthy. Your provider may have you take certain vitamins while you are on this diet.  Reasons to eat a low-fiber diet  The goal of a low-fiber diet is to limit the size and number of your stools. It may be prescribed if you:  · Are having chemotherapy or radiation treatments  · Have had intestinal surgery  · Have a condition that affects your intestine, such as irritable bowel syndrome, Crohns disease, ulcerative colitis, or diverticulitis  General guidelines for a low-fiber diet  In general, a low-fiber diet means having fewer than 13 grams of fiber a day. Your  provider may give you a list of things you can and cant eat or drink. Read food labels. Choose foods and drinks that have as close to zero grams of fiber as possible. Here are general guidelines to follow:  Breads, pasta, cereal, rice, and other starches (6 to 11 servings daily)  · What to choose: white bread, biscuits, muffins, and white rolls; plain crackers; waffles; white pasta; white rice; cream of wheat; grits; white pancakes; corn flakes; cooked potatoes without skin.  Fiber content of these foods should be less than 0.5 (1/2) gram per serving.  · What to avoid: whole-wheat or whole-grain breads, crackers, and pasta; breads with seeds or nuts; wheat germ; angeles crackers; cornbread; wild or brown rice; whole-grain, bran, and granola cereals; cereals with seeds, nuts, coconut, or dried fruit; potatoes with skin  Milk and dairy (2 servings daily)  · What to choose: milk, buttermilk; yogurt or ice cream without seeds or nuts; custard or pudding; sour cream; cheese and cottage cheese  · What to avoid: ice cream and yogurt with seeds, nuts, or fruit chunks  Fruit (2 to 4 servings daily)  · What to choose: ripe banana; ripe nectarine, peach, apricot, papaya, and plum; soft honeydew melon and cantaloupe; cooked or canned fruit without skin or seeds (not sweetened with sorbitol); applesauce; strained fruit juice (without pulp)  · What to avoid: raw or dried fruit; all berries; raisins; canned and raw pineapple; prunes and prune juice; fruit juice with pulp  Vegetables (3 to 5 servings daily)  · What to choose: well-cooked or canned vegetables without seeds, such as spinach, eggplant, green and wax beans, carrots, yellow squash, pumpkin; lettuce on a sandwich  · What to avoid: all raw or steamed vegetables; vegetables with seeds, such as unstrained tomato sauce; green peas; lima beans; broccoli; corn; parsnips  Meats and protein (4 to 6 ounces daily)  · What to choose: tender, well-cooked meat, including ground meat,  poultry, and fish; eggs; tofu; creamy peanut butter  · What to avoid: tough, chewy meat with gristle; peas, including split, yellow, and black-eyed; beans, including navy, lima, black, garbanzo, soy, murcia, and lentil; peanuts and crunchy peanut butter   Fats, oils, sauces, condiments (fewer than 8 teaspoons daily)  · What to choose: butter, magarine, oils, whipped cream, sour cream, mayonnaise, smooth dressings and sauces; plain gravy; smooth condiments  · What to avoid: dressing with seeds or fruit chunks; pickles and relishes  Other foods and drinks  · What to choose: water; plain gelatin; plain puddings; pretzels; plain cookies and cakes; honey, syrup; decaffeinated drinks, including tea and coffee    · What to avoid: popcorn; potato chips; spicy foods; fried, greasy foods; alcohol (ask your provider); marmalade, jam, and preserves; desserts that have seeds, nuts, coconut, dried fruit, whole grains or bran; candy that has seeds or nuts; drinks sweetened with sorbitol or other sugar substitutes; caffeinated drinks, including tea, coffee, soda, and energy drinks  Date Last Reviewed: 6/18/2015 © 2000-2017 [employer]. [address] [place] [address], [place] [address]. All rights reserved. This information is not intended as a substitute for professional medical care. Always follow your healthcare professional's instructions.

## 2018-11-07 ENCOUNTER — TELEPHONE (OUTPATIENT)
Dept: INTERNAL MEDICINE | Facility: CLINIC | Age: 83
End: 2018-11-07

## 2018-11-07 NOTE — PROGRESS NOTES
The x-rays do show the multiple levels of degeneration of the disc height loss and lots of bone spurs and the scoliosis.  The bowel x-ray however does not show any obstruction or significant stool present.  Please proceed forward with doing this stool studies if her symptoms do not get better with reducing the milk.  Monica Jones MD

## 2018-11-07 NOTE — TELEPHONE ENCOUNTER
Please set up appointment for pain interventionalist due to x-ray findings with these significant scoliosis but no compression fractures.   Monica Jones MD

## 2018-12-15 ENCOUNTER — HOSPITAL ENCOUNTER (OUTPATIENT)
Dept: RADIOLOGY | Facility: HOSPITAL | Age: 83
Discharge: HOME | End: 2018-12-15
Attending: PAIN MEDICINE
Payer: MEDICARE

## 2018-12-15 ENCOUNTER — OFFICE VISIT (OUTPATIENT)
Dept: PAIN MEDICINE | Facility: CLINIC | Age: 83
End: 2018-12-15
Payer: MEDICARE

## 2018-12-15 VITALS
RESPIRATION RATE: 15 BRPM | HEART RATE: 68 BPM | HEIGHT: 63 IN | SYSTOLIC BLOOD PRESSURE: 152 MMHG | BODY MASS INDEX: 23.83 KG/M2 | WEIGHT: 134.5 LBS | DIASTOLIC BLOOD PRESSURE: 63 MMHG

## 2018-12-15 DIAGNOSIS — M41.26 OTHER IDIOPATHIC SCOLIOSIS, LUMBAR REGION: ICD-10-CM

## 2018-12-15 DIAGNOSIS — M47.816 LUMBAR SPONDYLOSIS: Primary | ICD-10-CM

## 2018-12-15 DIAGNOSIS — M47.816 LUMBAR SPONDYLOSIS: ICD-10-CM

## 2018-12-15 PROCEDURE — 3077F SYST BP >= 140 MM HG: CPT | Mod: CPTII,HCNC,S$GLB, | Performed by: PAIN MEDICINE

## 2018-12-15 PROCEDURE — 99203 OFFICE O/P NEW LOW 30 MIN: CPT | Mod: HCNC,S$GLB,, | Performed by: PAIN MEDICINE

## 2018-12-15 PROCEDURE — 1101F PT FALLS ASSESS-DOCD LE1/YR: CPT | Mod: CPTII,HCNC,S$GLB, | Performed by: PAIN MEDICINE

## 2018-12-15 PROCEDURE — 99999 PR PBB SHADOW E&M-EST. PATIENT-LVL III: CPT | Mod: PBBFAC,HCNC,, | Performed by: PAIN MEDICINE

## 2018-12-15 PROCEDURE — 72100 X-RAY EXAM L-S SPINE 2/3 VWS: CPT | Mod: 26,HCNC,, | Performed by: RADIOLOGY

## 2018-12-15 PROCEDURE — 72100 X-RAY EXAM L-S SPINE 2/3 VWS: CPT | Mod: TC,FY,HCNC,PO

## 2018-12-15 PROCEDURE — 3078F DIAST BP <80 MM HG: CPT | Mod: CPTII,HCNC,S$GLB, | Performed by: PAIN MEDICINE

## 2018-12-15 RX ORDER — MENTHOL 5.8 MG/1
LOZENGE ORAL
COMMUNITY
Start: 2018-10-01 | End: 2019-01-15

## 2018-12-15 NOTE — PATIENT INSTRUCTIONS
- recommend continuing Tylenol over-the-counter as needed  -ordered Rollator to help with balance instability  -obtain lumbar x-ray today  - follow up in clinic in 6 weeks

## 2018-12-15 NOTE — LETTER
December 15, 2018      Monica Jones MD  [address]  [address]  [place] [address]           [facility]  [address] [facility] [address] [place] [address]  Phone: [phone]  Fax: [phone]          Patient: Soren Gasca   MR Number: [id number]   YOB: 1933   Date of Visit: 12/15/2018       Dear Dr. Monica Jones:    Thank you for referring Soren Gasca to me for evaluation. Attached you will find relevant portions of my assessment and plan of care.    If you have questions, please do not hesitate to call me. I look forward to following Soren Gasca along with you.    Sincerely,    Jalen Mack MD    Enclosure  CC:  No Recipients    If you would like to receive this communication electronically, please contact [email or web address] or [phone] to request more information on [employer] access.    For providers and/or their staff who would like to refer a patient to [facility], please contact us through our one-stop-shop provider referral line, [facility], at [phone].    If you feel you have received this communication in error or would no longer like to receive these types of communications, please e-mail [email or web address]

## 2018-12-15 NOTE — PROGRESS NOTES
Chief Pain Complaint:  Back Pain (pt c/o low back pain )      History of Present Illness:   This patient is a 85 y.o. female who presents today complaining of the above noted pain/s. The patient describes the pain as follows.  Ms. Gasca reports having constant low back pain for greater than the last 10 years.  Currently her pain is rated as an 8/10 and is described as an aching sensation.  She reports that the left side of her low back seems to be worse than the right side.  She denies having numbness and weakness in her lower extremities in addition to no difficulties with bowel bladder.  She has found that activity causes her symptoms to worsen and rest helps improve her symptoms therefore she spends much of her time at home these days instead of shopping and attending [employer].  She also feels somewhat unsteady on her feet as she feels that she is sometimes falls off towards the left side and has to catch herself on furniture oral wall.  She has participated physical therapy fairly recently and completed several weeks a few months ago and she feels like she did not receive much benefit.    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    Comparison: None     Findings:     Moderate scoliosis with mid lumbar convexity to the right noted.  Multilevel marginal spurring and facet arthropathy with varying degrees of loss of disc height throughout  "the lower thoracic and lumbar spine.  No acute fracture identified.  Mild   degenerative change in the SI and hip joints.  Pedicles appear intact.  Postsurgical changes the left upper quadrant.    Impression       1.  Rotary scoliosis and spondylosis.  2.  Additional findings as above.         Review of Systems:  Review of Systems   Constitutional: Negative for fever.   Eyes: Negative for blurred vision.   Respiratory: Negative for cough and wheezing.    Cardiovascular: Negative for chest pain and orthopnea.   Gastrointestinal: Negative for constipation, diarrhea, nausea and vomiting.   Genitourinary: Negative for dysuria.   Musculoskeletal: Positive for back pain.   Skin: Negative for itching and rash.   Neurological: Positive for focal weakness.   Endo/Heme/Allergies: Does not bruise/bleed easily.       Physical Exam:  BP (!) 152/63 (BP Location: Right arm, Patient Position: Sitting)   Pulse 68   Resp 15   Ht 5' 3" (1.6 m)   Wt 61 kg (134 lb 7.7 oz)   BMI 23.82 kg/m²  (reviewed on 12/15/2018)\  General    Constitutional: She is oriented to person, place, and time. She appears well-developed and well-nourished.   HENT:   Head: Normocephalic and atraumatic.   Eyes: EOM are normal.   Neck: Neck supple.   Pulmonary/Chest: Effort normal.   Abdominal: She exhibits no distension.   Neurological: She is alert and oriented to person, place, and time. No cranial nerve deficit.   Psychiatric: She has a normal mood and affect.     General Musculoskeletal Exam   Gait: abnormal and antalgic     Back (L-Spine & T-Spine) / Neck (C-Spine) Exam     Tenderness Right paramedian tenderness of the Lower L-Spine. Left paramedian tenderness of the Lower L-Spine.     Back (L-Spine & T-Spine) Range of Motion   Lateral bend right: abnormal   Lateral bend left: abnormal   Rotation right: abnormal   Rotation left: abnormal     Spinal Sensation   Right Side Sensation  L-Spine Level: normal  Left Side Sensation  L-Spine Level: " normal    Other She has scoliosis .    Comments:  Bilateral straight leg raise negative for radicular symptoms      Muscle Strength   Right Lower Extremity   Hip Flexion: 4/5   Quadriceps:  5/5   Anterior tibial:  5/5/5  Left Lower Extremity   Hip Flexion: 4/5   Quadriceps:  5/5   Anterior tibial:  5/5/5     Reflexes     Left Side  Quadriceps:  2+  Achilles:  2+  Ankle Clonus:  absent    Right Side   Quadriceps:  2+  Achilles:  2+  Ankle Clonus:  absent      Assessment  Lumbar Spondylosis  Lumbar scoliosis    1. 85 y.o. year old patient with PMH of   Past Medical History:   Diagnosis Date    Acid reflux     Arthritis     Dizziness     H/O splenectomy     for ITP    Hearing loss     History of bleeding ulcers     Hypertension     Leg swelling     Macular degeneration     Memory loss     Thrombocytopenia       presenting with pain located lumbar spine  2. Pain Generators / Etiology: Lumbar Spondylosis, lumbar scoliosis  3. Failed Meds (E- Effective, NE- Not Effective):  Tylenol-mildly effective  4. Physical Therapy - Completed in the Past  5. Psychological comorbidities - None  6. Anticoagulants / Antiplatelets: None     PLAN:  1. Medications:  Continue Tylenol over-the-counter as tolerated   2. PT - completed physical therapy fairly recently; ordered a Rollator for her today to help with stability and balance when walking  3. Psychological - none  4. Labs - obtain  none; reviewed labs from 06/11/2018, creatinine 0.8  5. Imaging - obtain lumbar x-rays; reviewed lumbar x-ray of greater than 4 years ago with patient's daughter today in clinic  6. Interventions - schedule none consider lumbar medial branch blocks in the future  7. Referrals - none  8. Records - none  9. Follow up visit - follow up in clinic in 6 weeks  10. Patient Questions - answered all of the patient's questions regarding diagnosis, therapy, and treatment    NIKKI Mack MD  Interventional Pain  [facility] [place]

## 2018-12-18 ENCOUNTER — TELEPHONE (OUTPATIENT)
Dept: INTERNAL MEDICINE | Facility: CLINIC | Age: 83
End: 2018-12-18

## 2018-12-18 DIAGNOSIS — Z12.31 BREAST CANCER SCREENING BY MAMMOGRAM: Primary | ICD-10-CM

## 2018-12-19 NOTE — TELEPHONE ENCOUNTER
----- Message from Susan Ng sent at 12/18/2018  4:13 PM CST -----  Contact: lesley-daughter  Requesting an order for a mammogram.her last mammogram was on 1/4/17.please call daughter back once in system at [phone].      Thanks,  Susan Ng

## 2019-01-07 ENCOUNTER — HOSPITAL ENCOUNTER (OUTPATIENT)
Dept: RADIOLOGY | Facility: HOSPITAL | Age: 84
Discharge: HOME | End: 2019-01-07
Attending: FAMILY MEDICINE
Payer: MEDICARE

## 2019-01-07 VITALS — BODY MASS INDEX: 23.74 KG/M2 | HEIGHT: 63 IN | WEIGHT: 134 LBS

## 2019-01-07 DIAGNOSIS — Z12.31 BREAST CANCER SCREENING BY MAMMOGRAM: ICD-10-CM

## 2019-01-07 PROCEDURE — 77063 MAMMO DIGITAL SCREENING BILAT WITH TOMOSYNTHESIS_CAD: ICD-10-PCS | Mod: 26,HCNC,, | Performed by: RADIOLOGY

## 2019-01-07 PROCEDURE — 77067 SCR MAMMO BI INCL CAD: CPT | Mod: 26,HCNC,, | Performed by: RADIOLOGY

## 2019-01-07 PROCEDURE — 77067 MAMMO DIGITAL SCREENING BILAT WITH TOMOSYNTHESIS_CAD: ICD-10-PCS | Mod: 26,HCNC,, | Performed by: RADIOLOGY

## 2019-01-07 PROCEDURE — 77067 SCR MAMMO BI INCL CAD: CPT | Mod: TC,HCNC,PO

## 2019-01-07 PROCEDURE — 77063 BREAST TOMOSYNTHESIS BI: CPT | Mod: 26,HCNC,, | Performed by: RADIOLOGY

## 2019-01-08 ENCOUNTER — TELEPHONE (OUTPATIENT)
Dept: PAIN MEDICINE | Facility: CLINIC | Age: 84
End: 2019-01-08

## 2019-01-08 NOTE — TELEPHONE ENCOUNTER
Contacted Goyo at [facility]. Orders verified for rollator only.     ----- Message from Steffi Ramos sent at 1/8/2019  1:17 PM CST -----  Contact: [facility]  Requesting call back regarding wrong rx being wrote out for pt. Pt was suppose to be receiving back brace and gor rx written for something else. Please call back at [phone].      Thanks,  Steffi Ramos

## 2019-01-15 ENCOUNTER — OFFICE VISIT (OUTPATIENT)
Dept: INTERNAL MEDICINE | Facility: CLINIC | Age: 84
End: 2019-01-15
Payer: MEDICARE

## 2019-01-15 ENCOUNTER — LAB VISIT (OUTPATIENT)
Dept: LAB | Facility: HOSPITAL | Age: 84
End: 2019-01-15
Attending: FAMILY MEDICINE
Payer: MEDICARE

## 2019-01-15 VITALS
SYSTOLIC BLOOD PRESSURE: 100 MMHG | HEIGHT: 63 IN | WEIGHT: 131.38 LBS | TEMPERATURE: 97 F | DIASTOLIC BLOOD PRESSURE: 60 MMHG | BODY MASS INDEX: 23.28 KG/M2 | HEART RATE: 94 BPM

## 2019-01-15 DIAGNOSIS — D69.6 THROMBOCYTOPENIA: Chronic | ICD-10-CM

## 2019-01-15 DIAGNOSIS — E53.8 B12 DEFICIENCY: ICD-10-CM

## 2019-01-15 DIAGNOSIS — I10 ESSENTIAL HYPERTENSION: Chronic | ICD-10-CM

## 2019-01-15 DIAGNOSIS — Z00.00 ROUTINE GENERAL MEDICAL EXAMINATION AT A HEALTH CARE FACILITY: ICD-10-CM

## 2019-01-15 DIAGNOSIS — E78.5 HYPERLIPIDEMIA, UNSPECIFIED HYPERLIPIDEMIA TYPE: Chronic | ICD-10-CM

## 2019-01-15 DIAGNOSIS — M85.80 OSTEOPENIA, UNSPECIFIED LOCATION: ICD-10-CM

## 2019-01-15 DIAGNOSIS — E55.9 VITAMIN D DEFICIENCY: ICD-10-CM

## 2019-01-15 DIAGNOSIS — E61.1 IRON DEFICIENCY: ICD-10-CM

## 2019-01-15 DIAGNOSIS — I70.0 CALCIFICATION OF AORTA: Chronic | ICD-10-CM

## 2019-01-15 DIAGNOSIS — R41.82 ALTERED MENTAL STATUS, UNSPECIFIED ALTERED MENTAL STATUS TYPE: ICD-10-CM

## 2019-01-15 DIAGNOSIS — Z00.00 ROUTINE GENERAL MEDICAL EXAMINATION AT A HEALTH CARE FACILITY: Primary | ICD-10-CM

## 2019-01-15 DIAGNOSIS — G31.84 MILD COGNITIVE IMPAIRMENT WITH MEMORY LOSS: Chronic | ICD-10-CM

## 2019-01-15 LAB
ALBUMIN SERPL BCP-MCNC: 3.9 G/DL
ALBUMIN/CREAT UR: 13.9 UG/MG
ALP SERPL-CCNC: 70 U/L
ALT SERPL W/O P-5'-P-CCNC: 13 U/L
ANION GAP SERPL CALC-SCNC: 10 MMOL/L
AST SERPL-CCNC: 31 U/L
BACTERIA #/AREA URNS AUTO: ABNORMAL /HPF
BASOPHILS # BLD AUTO: 0.05 K/UL
BASOPHILS NFR BLD: 0.5 %
BILIRUB SERPL-MCNC: 0.3 MG/DL
BILIRUB UR QL STRIP: NEGATIVE
BUN SERPL-MCNC: 17 MG/DL
CALCIUM SERPL-MCNC: 9.9 MG/DL
CHLORIDE SERPL-SCNC: 105 MMOL/L
CHOLEST SERPL-MCNC: 233 MG/DL
CHOLEST/HDLC SERPL: 3.2 {RATIO}
CLARITY UR REFRACT.AUTO: ABNORMAL
CO2 SERPL-SCNC: 29 MMOL/L
COLOR UR AUTO: YELLOW
CREAT SERPL-MCNC: 0.8 MG/DL
CREAT UR-MCNC: 72 MG/DL
DIFFERENTIAL METHOD: ABNORMAL
EOSINOPHIL # BLD AUTO: 0 K/UL
EOSINOPHIL NFR BLD: 0.4 %
ERYTHROCYTE [DISTWIDTH] IN BLOOD BY AUTOMATED COUNT: 14.2 %
EST. GFR  (AFRICAN AMERICAN): >60 ML/MIN/1.73 M^2
EST. GFR  (NON AFRICAN AMERICAN): >60 ML/MIN/1.73 M^2
GLUCOSE SERPL-MCNC: 133 MG/DL
GLUCOSE UR QL STRIP: NEGATIVE
HCT VFR BLD AUTO: 38.1 %
HDLC SERPL-MCNC: 72 MG/DL
HDLC SERPL: 30.9 %
HGB BLD-MCNC: 12.4 G/DL
HGB UR QL STRIP: ABNORMAL
IMM GRANULOCYTES # BLD AUTO: 0.02 K/UL
IMM GRANULOCYTES NFR BLD AUTO: 0.2 %
IRON SERPL-MCNC: 59 UG/DL
KETONES UR QL STRIP: NEGATIVE
LDLC SERPL CALC-MCNC: 123.4 MG/DL
LEUKOCYTE ESTERASE UR QL STRIP: ABNORMAL
LYMPHOCYTES # BLD AUTO: 4.5 K/UL
LYMPHOCYTES NFR BLD: 41.8 %
MCH RBC QN AUTO: 31.8 PG
MCHC RBC AUTO-ENTMCNC: 32.5 G/DL
MCV RBC AUTO: 98 FL
MICROALBUMIN UR DL<=1MG/L-MCNC: 10 UG/ML
MICROSCOPIC COMMENT: ABNORMAL
MONOCYTES # BLD AUTO: 0.6 K/UL
MONOCYTES NFR BLD: 5.2 %
NEUTROPHILS # BLD AUTO: 5.6 K/UL
NEUTROPHILS NFR BLD: 51.9 %
NITRITE UR QL STRIP: POSITIVE
NONHDLC SERPL-MCNC: 161 MG/DL
NRBC BLD-RTO: 0 /100 WBC
PH UR STRIP: 5 [PH] (ref 5–8)
PLATELET # BLD AUTO: 273 K/UL
PMV BLD AUTO: 11.5 FL
POTASSIUM SERPL-SCNC: 3.9 MMOL/L
PROT SERPL-MCNC: 7.9 G/DL
PROT UR QL STRIP: NEGATIVE
RBC # BLD AUTO: 3.9 M/UL
RBC #/AREA URNS AUTO: 1 /HPF (ref 0–4)
SATURATED IRON: 15 %
SODIUM SERPL-SCNC: 144 MMOL/L
SP GR UR STRIP: 1.01 (ref 1–1.03)
SQUAMOUS #/AREA URNS AUTO: 2 /HPF
T4 FREE SERPL-MCNC: 0.91 NG/DL
TOTAL IRON BINDING CAPACITY: 401 UG/DL
TRANSFERRIN SERPL-MCNC: 271 MG/DL
TRIGL SERPL-MCNC: 188 MG/DL
TSH SERPL DL<=0.005 MIU/L-ACNC: 1.24 UIU/ML
URN SPEC COLLECT METH UR: ABNORMAL
VIT B12 SERPL-MCNC: 1649 PG/ML
WBC # BLD AUTO: 10.73 K/UL
WBC #/AREA URNS AUTO: 13 /HPF (ref 0–5)

## 2019-01-15 PROCEDURE — 3078F DIAST BP <80 MM HG: CPT | Mod: CPTII,HCNC,S$GLB, | Performed by: FAMILY MEDICINE

## 2019-01-15 PROCEDURE — 80061 LIPID PANEL: CPT | Mod: HCNC

## 2019-01-15 PROCEDURE — 82306 VITAMIN D 25 HYDROXY: CPT | Mod: HCNC

## 2019-01-15 PROCEDURE — 84439 ASSAY OF FREE THYROXINE: CPT | Mod: HCNC

## 2019-01-15 PROCEDURE — 87086 URINE CULTURE/COLONY COUNT: CPT | Mod: HCNC

## 2019-01-15 PROCEDURE — 85025 COMPLETE CBC W/AUTO DIFF WBC: CPT | Mod: HCNC

## 2019-01-15 PROCEDURE — 87186 SC STD MICRODIL/AGAR DIL: CPT | Mod: HCNC

## 2019-01-15 PROCEDURE — 99999 PR PBB SHADOW E&M-EST. PATIENT-LVL IV: ICD-10-PCS | Mod: PBBFAC,HCNC,, | Performed by: FAMILY MEDICINE

## 2019-01-15 PROCEDURE — 81001 URINALYSIS AUTO W/SCOPE: CPT | Mod: HCNC

## 2019-01-15 PROCEDURE — 99999 PR PBB SHADOW E&M-EST. PATIENT-LVL IV: CPT | Mod: PBBFAC,HCNC,, | Performed by: FAMILY MEDICINE

## 2019-01-15 PROCEDURE — 3078F PR MOST RECENT DIASTOLIC BLOOD PRESSURE < 80 MM HG: ICD-10-PCS | Mod: CPTII,HCNC,S$GLB, | Performed by: FAMILY MEDICINE

## 2019-01-15 PROCEDURE — 87077 CULTURE AEROBIC IDENTIFY: CPT | Mod: HCNC

## 2019-01-15 PROCEDURE — 99397 PER PM REEVAL EST PAT 65+ YR: CPT | Mod: HCNC,S$GLB,, | Performed by: FAMILY MEDICINE

## 2019-01-15 PROCEDURE — 36415 COLL VENOUS BLD VENIPUNCTURE: CPT | Mod: HCNC,PO

## 2019-01-15 PROCEDURE — 83540 ASSAY OF IRON: CPT | Mod: HCNC

## 2019-01-15 PROCEDURE — 3074F SYST BP LT 130 MM HG: CPT | Mod: CPTII,HCNC,S$GLB, | Performed by: FAMILY MEDICINE

## 2019-01-15 PROCEDURE — 84443 ASSAY THYROID STIM HORMONE: CPT | Mod: HCNC

## 2019-01-15 PROCEDURE — 82607 VITAMIN B-12: CPT | Mod: HCNC

## 2019-01-15 PROCEDURE — 3074F PR MOST RECENT SYSTOLIC BLOOD PRESSURE < 130 MM HG: ICD-10-PCS | Mod: CPTII,HCNC,S$GLB, | Performed by: FAMILY MEDICINE

## 2019-01-15 PROCEDURE — 99499 RISK ADDL DX/OHS AUDIT: ICD-10-PCS | Mod: S$GLB,,, | Performed by: FAMILY MEDICINE

## 2019-01-15 PROCEDURE — 80053 COMPREHEN METABOLIC PANEL: CPT | Mod: HCNC

## 2019-01-15 PROCEDURE — 99499 UNLISTED E&M SERVICE: CPT | Mod: S$GLB,,, | Performed by: FAMILY MEDICINE

## 2019-01-15 PROCEDURE — 87088 URINE BACTERIA CULTURE: CPT | Mod: HCNC

## 2019-01-15 PROCEDURE — 82043 UR ALBUMIN QUANTITATIVE: CPT | Mod: HCNC

## 2019-01-15 PROCEDURE — 99397 PR PREVENTIVE VISIT,EST,65 & OVER: ICD-10-PCS | Mod: HCNC,S$GLB,, | Performed by: FAMILY MEDICINE

## 2019-01-15 RX ORDER — MECLIZINE HYDROCHLORIDE 25 MG/1
25 TABLET ORAL EVERY MORNING
Qty: 90 TABLET | Refills: 3 | Status: SHIPPED | OUTPATIENT
Start: 2019-01-15 | End: 2020-01-20 | Stop reason: SDUPTHER

## 2019-01-15 RX ORDER — METOPROLOL SUCCINATE 25 MG/1
12.5-25 TABLET, EXTENDED RELEASE ORAL EVERY MORNING
Qty: 90 TABLET | Refills: 3 | Status: SHIPPED | OUTPATIENT
Start: 2019-01-15 | End: 2020-01-20 | Stop reason: SDUPTHER

## 2019-01-15 RX ORDER — PANTOPRAZOLE SODIUM 40 MG/1
40 TABLET, DELAYED RELEASE ORAL EVERY MORNING
Qty: 90 TABLET | Refills: 3 | Status: SHIPPED | OUTPATIENT
Start: 2019-01-15 | End: 2020-01-20 | Stop reason: SDUPTHER

## 2019-01-15 RX ORDER — DONEPEZIL HYDROCHLORIDE 10 MG/1
10 TABLET, FILM COATED ORAL NIGHTLY
Qty: 90 TABLET | Refills: 3 | Status: SHIPPED | OUTPATIENT
Start: 2019-01-15 | End: 2020-01-20

## 2019-01-15 NOTE — PATIENT INSTRUCTIONS
[facility]  Alzheimer's throughout [place], [place] on aging,     Echo spot through [facility]

## 2019-01-15 NOTE — PROGRESS NOTES
Subjective:      Patient ID: Soren Gasca is a 85 y.o. female.    Chief Complaint: Annual Exam    Disclaimer:  This note is prepared using voice recognition software and as such is likely to have errors and has not been proof read. Please contact me for questions.     Here today for annual checkup but lately her memory has gotten really really bad. Not remembering to ttake her meds lately. Has pill box, but some are doubled up and some things aren't there. Can't seem to handle the medciations anymore.   Wants to see if we can lessen some. Granddaughter frida lives with her and can make sure she takes am meds.  A lot of this has worsened in the past week.  One week ago she was driving and ended up getting lost at the [employer].  She ended up blocking her keys in the car.  She could not remember her own name.  She could remember had to get to her daughter's house and likely the manager at [employer] drove her to her daughter's house.  Then her daughter took away the keys.  They are concerned because they have seen a lot of her medications with changes and variations when she should only have 1 pill there she has 3 and vice versa.  She does have a history of UTIs in the past but she does not complain about any discomfort.  They are just concerned because she has had this rapid decline.  They would like to minimize her medications if at all possible to assist with any further issues.    Upon reviewing her medications her daughter feels that she is not really taking her estrogen.  They have not seen her Lasix either so they would like to stop these 2.  She is taking meclizine anywhere from 2 to 3 times a day but does not feel like she really needs it that much.  She is also forgetting at times to take her metoprolol.  She has metoprolol 25 mg b.i.d..  She is willing to changes to an XL version as well.  She has Aricept at night.  She has been taking iron and B12 and folate at times with her uncertain  how much she is getting.  I believe this could all be substituted for just a multivitamin with iron.  They think this would help as well.    She is due for a bone density.    She has cognitive impairment with memory loss and appears to have gone either from mild-to-moderate or an acute exacerbation of some delirium.  I informed him that we need to do some further workup to make sure she does not have infection and thus they are willing to do blood work today.  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        Lab Results   Component Value Date    WBC 9.27 06/11/2018    HGB 11.7 (L) 06/11/2018    HCT 36.9 (L) 06/11/2018     06/11/2018    CHOL 195 06/11/2018    TRIG 153 (H) 06/11/2018    HDL 65 06/11/2018    ALT 10 06/11/2018    AST 17 06/11/2018     06/11/2018    K 3.3 (L) 06/11/2018     06/11/2018    CREATININE 0.8 06/11/2018    BUN 13 06/11/2018    CO2 29 06/11/2018    TSH 1.201 06/11/2018    INR 1.0 12/03/2009    HGBA1C 5.8 05/02/2017       Mammo Digital Screening Bilat w/ Levi  Result:  Mammo Digital Screening Bilat w/ Levi    History:  Patient is 85 y.o. and is seen for a screening mammogram.    Films Compared:  Compared to: 01/04/2017 Mammo Digital Diagnostic Bilat with   Tomosynthesis_CAD and 10/22/2015 Mammo Digital Screening Bilat With CAD    Findings:  Computer-aided detection was utilized in the interpretation of this   examination. This procedure was performed using tomosynthesis.       The breasts have scattered areas of fibroglandular density. There is no   evidence of suspicious masses, microcalcifications or architectural   distortion.    Impression:  No mammographic evidence of malignancy.    BI-RADS Category 1: Negative    Recommendation:  Routine screening mammogram in 1 year is recommended.    The patient's estimated lifetime risk of breast cancer (to age 85) based   on Tyrer-Cuzick - 7 risk assessment model is: Tyrer-Cuzick: 0 %. According   to the American  "Cancer Society,  patients with a lifetime breast cancer   risk of 20% or higher might benefit from supplemental screening tests.        Review of Systems   Constitutional: Positive for activity change. Negative for chills, fatigue and fever.   HENT: Negative for ear pain and trouble swallowing.    Eyes: Negative for pain and visual disturbance.   Respiratory: Negative for cough and shortness of breath.    Cardiovascular: Negative for chest pain and leg swelling.   Gastrointestinal: Negative for abdominal pain, blood in stool, nausea and vomiting.   Endocrine: Negative for cold intolerance and heat intolerance.   Genitourinary: Negative for decreased urine volume, dysuria, frequency and urgency.   Musculoskeletal: Negative for joint swelling, myalgias and neck pain.   Skin: Negative for color change and rash.   Neurological: Negative for dizziness and headaches.   Psychiatric/Behavioral: Positive for confusion and decreased concentration. Negative for behavioral problems and sleep disturbance. The patient is nervous/anxious.      Objective:     Vitals:    01/15/19 1339   BP: 100/60   Pulse: 94   Temp: 97 °F (36.1 °C)   Weight: 59.6 kg (131 lb 6.3 oz)   Height: 5' 3" (1.6 m)     Physical Exam   Constitutional: She appears well-developed and well-nourished.   HENT:   Head: Normocephalic and atraumatic.   Right Ear: External ear normal.   Left Ear: External ear normal.   Mouth/Throat: Oropharynx is clear and moist.   Eyes: EOM are normal.   Neck: Normal range of motion. Neck supple. No thyromegaly present.   Cardiovascular: Normal rate and regular rhythm. Exam reveals no gallop and no friction rub.   No murmur heard.  Pulmonary/Chest: Effort normal. No respiratory distress. She has no wheezes. She has no rales.   Abdominal: Soft. Bowel sounds are normal. She exhibits no distension. There is no tenderness. There is no rebound.   Musculoskeletal: Normal range of motion. She exhibits no edema.   Lymphadenopathy:     She " has no cervical adenopathy.   Neurological: She is alert.   This oriented, having difficulty finding common words.   Skin: Skin is warm and dry. No rash noted.   Psychiatric: Her speech is normal and behavior is normal. Judgment and thought content normal. Her mood appears anxious. Cognition and memory are impaired. She exhibits abnormal recent memory and abnormal remote memory. She is attentive.   Vitals reviewed.    Assessment:     1. Routine general medical examination at a health care facility    2. Mild cognitive impairment with memory loss    3. Essential hypertension    4. Calcification of aorta    5. Hyperlipidemia, unspecified hyperlipidemia type    6. Iron deficiency    7. B12 deficiency    8. Vitamin D deficiency    9. Osteopenia, unspecified location    10. Altered mental status, unspecified altered mental status type    11. Thrombocytopenia      Plan:   Soren was seen today for annual exam.    Diagnoses and all orders for this visit:    Routine general medical examination at a health care facility-at this time will get her baseline lab work for her routine annual exam.  Follow-up based on the results.  -     TSH; Future  -     T4, free; Future  -     Lipid panel; Future  -     Microalbumin/creatinine urine ratio  -     Comprehensive metabolic panel; Future  -     CBC auto differential; Future  -     Vitamin D; Future  -     Vitamin B12; Future  -     Iron and TIBC; Future    Mild cognitive impairment with memory loss-acutely worse rule out delirium due to an acute fractures cause.  If no infectious causes noted then may consider it the following back up with Neurology sooner or possibly additional imaging studies to evaluate for TIA versus stroke versus just worsening of her dementia.  We will minimize her medications to help in this regard.  She she has upcoming appointment with Neurology in March.  Currently only on Aricept.  -     TSH; Future  -     T4, free; Future  -     Lipid panel; Future  -      Microalbumin/creatinine urine ratio  -     Comprehensive metabolic panel; Future  -     CBC auto differential; Future  -     Vitamin D; Future  -     Vitamin B12; Future  -     Iron and TIBC; Future    Essential hypertension-appears to be low today not certain if she is really taking her metoprolol.  Will change her to metoprolol XL but just to half a tablet of the 25 mg for a total dose of 12.5 mg in the a.m..  -     TSH; Future  -     T4, free; Future  -     Lipid panel; Future  -     Microalbumin/creatinine urine ratio  -     Comprehensive metabolic panel; Future  -     CBC auto differential; Future  -     Vitamin D; Future  -     Vitamin B12; Future  -     Iron and TIBC; Future  -     Urinalysis  -     Urine culture; Future  -     Urine culture    Calcification of aorta-not currently on statin therapy.  Declines further additional medicines  -     TSH; Future  -     T4, free; Future  -     Lipid panel; Future  -     Microalbumin/creatinine urine ratio  -     Comprehensive metabolic panel; Future  -     CBC auto differential; Future  -     Vitamin D; Future  -     Vitamin B12; Future  -     Iron and TIBC; Future    Hyperlipidemia, unspecified hyperlipidemia type-checking lab work today declines statins  -     TSH; Future  -     T4, free; Future  -     Lipid panel; Future  -     Microalbumin/creatinine urine ratio  -     Comprehensive metabolic panel; Future  -     CBC auto differential; Future  -     Vitamin D; Future  -     Vitamin B12; Future  -     Iron and TIBC; Future    Iron deficiency-change iron supplementation to a multivitamin with iron to minimize medications  -     TSH; Future  -     T4, free; Future  -     Lipid panel; Future  -     Microalbumin/creatinine urine ratio  -     Comprehensive metabolic panel; Future  -     CBC auto differential; Future  -     Vitamin D; Future  -     Vitamin B12; Future  -     Iron and TIBC; Future    B12 deficiency-changed to multivitamin with B12  -     TSH; Future  -      T4, free; Future  -     Lipid panel; Future  -     Microalbumin/creatinine urine ratio  -     Comprehensive metabolic panel; Future  -     CBC auto differential; Future  -     Vitamin D; Future  -     Vitamin B12; Future  -     Iron and TIBC; Future    Vitamin D deficiency-changed multivitamin with vitamin-D  -     TSH; Future  -     T4, free; Future  -     Lipid panel; Future  -     Microalbumin/creatinine urine ratio  -     Comprehensive metabolic panel; Future  -     CBC auto differential; Future  -     Vitamin D; Future UTI today.  -     Vitamin B12; Future  -     Iron and TIBC; Future    Osteopenia, unspecified location-schedule bone density  -     DXA Bone Density Spine And Hip; Future    Altered mental status, unspecified altered mental status type-rule out acute infections such as UTI, checking lab work as well to rule out any kidney issues liver issues or other systemic infections or thyroid to cause altered mental status  -     Urinalysis  -     Urine culture; Future  -     Urine culture    Thrombocytopenia-checking lab work known splenectomy    Of note previously on estrogen per the daughter would like to stop the estrogen as they are not certain that she is actually really been taking it either.  We will discuss continue this medication.    For the vertigo will also change the meclizine to only once daily.        Will also stop the Xanax at this time as they do not feel like it is helping at this time.    Other orders  -     metoprolol succinate (TOPROL-XL) 25 MG 24 hr tablet; Take 0.5-1 tablets (12.5-25 mg total) by mouth every morning.  -     meclizine (ANTIVERT) 25 mg tablet; Take 1 tablet (25 mg total) by mouth every morning. For vertigo and nausea  -     pantoprazole (PROTONIX) 40 MG tablet; Take 1 tablet (40 mg total) by mouth every morning.  -     multivitamin-iron-folic acid Tab; 1 tablet po am with iron, b12, calcium, vitamin D  -     donepezil (ARICEPT) 10 MG tablet; Take 1 tablet (10 mg  total) by mouth every evening.            Follow-up in about 6 months (around 7/15/2019) for chronic issues Dr Jones.    Patient Instructions   [facility]  Alzheimer's [facility] on aging,     Echo spot through [employer]

## 2019-01-16 ENCOUNTER — PATIENT MESSAGE (OUTPATIENT)
Dept: INTERNAL MEDICINE | Facility: CLINIC | Age: 84
End: 2019-01-16

## 2019-01-16 LAB — 25(OH)D3+25(OH)D2 SERPL-MCNC: 55 NG/ML

## 2019-01-16 RX ORDER — CIPROFLOXACIN 250 MG/1
250 TABLET, FILM COATED ORAL 2 TIMES DAILY
Qty: 14 TABLET | Refills: 0 | Status: SHIPPED | OUTPATIENT
Start: 2019-01-16 | End: 2019-01-23

## 2019-01-18 LAB — BACTERIA UR CULT: NORMAL

## 2019-01-28 ENCOUNTER — OFFICE VISIT (OUTPATIENT)
Dept: PAIN MEDICINE | Facility: CLINIC | Age: 84
End: 2019-01-28
Payer: MEDICARE

## 2019-01-28 VITALS
RESPIRATION RATE: 18 BRPM | BODY MASS INDEX: 23.21 KG/M2 | HEART RATE: 82 BPM | WEIGHT: 131 LBS | SYSTOLIC BLOOD PRESSURE: 158 MMHG | HEIGHT: 63 IN | DIASTOLIC BLOOD PRESSURE: 76 MMHG

## 2019-01-28 DIAGNOSIS — M47.816 LUMBAR SPONDYLOSIS: Primary | ICD-10-CM

## 2019-01-28 DIAGNOSIS — M47.816 LUMBAR FACET ARTHROPATHY: ICD-10-CM

## 2019-01-28 DIAGNOSIS — M41.26 OTHER IDIOPATHIC SCOLIOSIS, LUMBAR REGION: ICD-10-CM

## 2019-01-28 PROCEDURE — 99999 PR PBB SHADOW E&M-EST. PATIENT-LVL III: CPT | Mod: PBBFAC,HCNC,, | Performed by: PHYSICIAN ASSISTANT

## 2019-01-28 PROCEDURE — 99214 OFFICE O/P EST MOD 30 MIN: CPT | Mod: HCNC,S$GLB,, | Performed by: PHYSICIAN ASSISTANT

## 2019-01-28 PROCEDURE — 99499 UNLISTED E&M SERVICE: CPT | Mod: S$GLB,,, | Performed by: PHYSICIAN ASSISTANT

## 2019-01-28 PROCEDURE — 1101F PT FALLS ASSESS-DOCD LE1/YR: CPT | Mod: HCNC,CPTII,S$GLB, | Performed by: PHYSICIAN ASSISTANT

## 2019-01-28 PROCEDURE — 99499 RISK ADDL DX/OHS AUDIT: ICD-10-PCS | Mod: S$GLB,,, | Performed by: PHYSICIAN ASSISTANT

## 2019-01-28 PROCEDURE — 1101F PR PT FALLS ASSESS DOC 0-1 FALLS W/OUT INJ PAST YR: ICD-10-PCS | Mod: HCNC,CPTII,S$GLB, | Performed by: PHYSICIAN ASSISTANT

## 2019-01-28 PROCEDURE — 99214 PR OFFICE/OUTPT VISIT, EST, LEVL IV, 30-39 MIN: ICD-10-PCS | Mod: HCNC,S$GLB,, | Performed by: PHYSICIAN ASSISTANT

## 2019-01-28 PROCEDURE — 99999 PR PBB SHADOW E&M-EST. PATIENT-LVL III: ICD-10-PCS | Mod: PBBFAC,HCNC,, | Performed by: PHYSICIAN ASSISTANT

## 2019-01-28 PROCEDURE — 3078F PR MOST RECENT DIASTOLIC BLOOD PRESSURE < 80 MM HG: ICD-10-PCS | Mod: HCNC,CPTII,S$GLB, | Performed by: PHYSICIAN ASSISTANT

## 2019-01-28 PROCEDURE — 3077F PR MOST RECENT SYSTOLIC BLOOD PRESSURE >= 140 MM HG: ICD-10-PCS | Mod: HCNC,CPTII,S$GLB, | Performed by: PHYSICIAN ASSISTANT

## 2019-01-28 PROCEDURE — 3077F SYST BP >= 140 MM HG: CPT | Mod: HCNC,CPTII,S$GLB, | Performed by: PHYSICIAN ASSISTANT

## 2019-01-28 PROCEDURE — 3078F DIAST BP <80 MM HG: CPT | Mod: HCNC,CPTII,S$GLB, | Performed by: PHYSICIAN ASSISTANT

## 2019-01-28 NOTE — PROGRESS NOTES
Chief Pain Complaint:  Low back pain    Interval History:  Patient was last seen on 12-15-18 with Dr. Mack.  At that visit, the plan was to start tumeric. She has only been taking for a couple weeks, so she is unsure how much it is helping.    History of Present Illness:   This patient is a 85 y.o. female who presents today complaining of the above noted pain/s. The patient describes the pain as follows.  Ms. Gasca reports having constant low back pain for greater than the last 10 years.  Currently her pain is rated as an 8/10 and is described as an aching sensation.  She reports that the left side of her low back seems to be worse than the right side.  She denies having numbness and weakness in her lower extremities in addition to no difficulties with bowel bladder.  She has found that activity causes her symptoms to worsen and rest helps improve her symptoms therefore she spends much of her time at home these days instead of shopping and attending [employer].  She also feels somewhat unsteady on her feet as she feels that she is sometimes falls off towards the left side and has to catch herself on furniture oral wall.  She has participated physical therapy fairly recently and completed several weeks a few months ago and she feels like she did not receive much benefit.    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  Vertebral body heights and alignment are unchanged.  There is again noted multilevel disc height loss and facet arthropathy.  No acute soft tissue abnormality.  Prominent aortic atherosclerotic calcification present.       Results for orders placed during the hospital encounter of 02/04/14   X-Ray Lumbar Spine Ap And Lateral    Narrative Study has just been made available for interpretation.  Comparison: None   Findings:   Moderate scoliosis with mid lumbar convexity to the right noted.  Multilevel marginal spurring and facet arthropathy with varying degrees of loss of disc height throughout the lower thoracic and lumbar spine.  No acute fracture identified.  Mild degenerative change in the SI and hip joints.  Pedicles appear intact.  Postsurgical changes the left upper quadrant.    Impression   1.  Rotary scoliosis and spondylosis.  2.  Additional findings as above.             Review of Systems:  Review of Systems   Constitutional: Negative for fever.   Eyes: Negative for blurred vision.   Respiratory: Negative for cough and wheezing.    Cardiovascular: Negative for chest pain and orthopnea.   Gastrointestinal: Negative for constipation, diarrhea, nausea and vomiting.   Genitourinary: Negative for dysuria.   Musculoskeletal: Positive for back pain.   Skin: Negative for itching and rash.   Neurological: Positive for focal weakness.   Endo/Heme/Allergies: Does not bruise/bleed easily.       Physical Exam:  BP (!) 158/76 (BP Location: Right arm, Patient Position: Sitting, BP Method: Medium (Automatic))   Pulse 82   Resp 18   Ht 5' 3" (1.6 m)   Wt 59.4 kg (131 lb)   BMI 23.21 kg/m²    (reviewed on 1/28/2019)  General    Constitutional: She is oriented to person, place, and time. She appears well-developed and well-nourished.   HENT:   Head: Normocephalic and atraumatic.   Eyes: EOM are normal.   Neck: Neck supple.   Pulmonary/Chest: Effort normal.   Abdominal: She exhibits no " distension.   Neurological: She is alert and oriented to person, place, and time. No cranial nerve deficit.   Psychiatric: She has a normal mood and affect.     General Musculoskeletal Exam   Gait: abnormal and antalgic     Back (L-Spine & T-Spine) / Neck (C-Spine) Exam     Tenderness Right paramedian tenderness of the Lower L-Spine and Sacrum. Left paramedian tenderness of the Lower L-Spine and Sacrum.     Back (L-Spine & T-Spine) Range of Motion   Extension: abnormal   Flexion: abnormal   Lateral bend right: abnormal   Lateral bend left: abnormal Back lateral bend left: ttp over left SIJ > right.   Rotation right: abnormal   Rotation left: abnormal     Spinal Sensation   Right Side Sensation  L-Spine Level: normal  Left Side Sensation  L-Spine Level: normal    Other She has scoliosis .    Comments:  Bilateral straight leg raise negative for radicular symptoms      Muscle Strength   Right Lower Extremity   Hip Flexion: 4/5   Quadriceps:  5/5   Anterior tibial:  5/5/5  Left Lower Extremity   Hip Flexion: 4/5   Quadriceps:  5/5   Anterior tibial:  5/5/5     Reflexes     Left Side  Quadriceps:  2+  Ankle Clonus:  absent    Right Side   Quadriceps:  2+  Ankle Clonus:  absent      Assessment  1. 85 y.o. year old patient with PMH of   Past Medical History:   Diagnosis Date    Acid reflux     Arthritis     Dizziness     H/O splenectomy     for ITP    Hearing loss     History of bleeding ulcers     Hypertension     Leg swelling     Macular degeneration     Memory loss     Thrombocytopenia       presenting with pain located lumbar spine.  Diagnoses include:    ICD-10-CM ICD-9-CM   1. Lumbar spondylosis M47.816 721.3   2. Other idiopathic scoliosis, lumbar region M41.26 737.30   3. Lumbar facet arthropathy M47.816 721.3     2. Pain Generators / Etiology: Lumbar Spondylosis, lumbar scoliosis  3. Failed Meds (E- Effective, NE- Not Effective):  Tylenol-mildly effective  4. Physical Therapy - Completed in the Past  5.  Psychological comorbidities - None  6. Anticoagulants / Antiplatelets: None       Plan:  1. Interventional: None for now.  Consider lumbar medial branch blocks in the future.    2. Pharmacologic:   - Continue tumeric 1000mg QD, which she started about 2 weeks ago. She will give this a few more weeks and will call for appt if no relief.  - Continue Tylenol 500mg PRN pain.    3. Rehabilitative: Encouraged regular exercise. She completed physical therapy fairly recently with limited relief. Rollator walker was ordered for her at last visit to help with stability and balance when walking.    4. Diagnostic: None for now.    5. Follow up:  PRN    - I discussed the risks, benefits, and alternatives to potential treatment options. All questions and concerns were fully addressed today in clinic. Dr. Dutta was consulted regarding the patient plan and agrees.

## 2019-02-04 ENCOUNTER — APPOINTMENT (OUTPATIENT)
Dept: RADIOLOGY | Facility: HOSPITAL | Age: 84
End: 2019-02-04
Attending: FAMILY MEDICINE
Payer: MEDICARE

## 2019-02-04 DIAGNOSIS — M85.80 OSTEOPENIA, UNSPECIFIED LOCATION: ICD-10-CM

## 2019-02-04 PROCEDURE — 77080 DXA BONE DENSITY AXIAL: CPT | Mod: GA,TC,HCNC

## 2019-02-04 PROCEDURE — 77080 DEXA BONE DENSITY SPINE HIP: ICD-10-PCS | Mod: 26,GA,HCNC, | Performed by: RADIOLOGY

## 2019-02-04 PROCEDURE — 77080 DXA BONE DENSITY AXIAL: CPT | Mod: 26,GA,HCNC, | Performed by: RADIOLOGY

## 2019-02-26 ENCOUNTER — TELEPHONE (OUTPATIENT)
Dept: INTERNAL MEDICINE | Facility: CLINIC | Age: 84
End: 2019-02-26

## 2019-02-26 DIAGNOSIS — N39.0 URINARY TRACT INFECTION WITHOUT HEMATURIA, SITE UNSPECIFIED: ICD-10-CM

## 2019-02-26 DIAGNOSIS — M19.90 ARTHRITIS: ICD-10-CM

## 2019-02-26 DIAGNOSIS — G31.84 MILD COGNITIVE IMPAIRMENT WITH MEMORY LOSS: Primary | Chronic | ICD-10-CM

## 2019-02-26 DIAGNOSIS — E78.5 HYPERLIPIDEMIA, UNSPECIFIED HYPERLIPIDEMIA TYPE: Chronic | ICD-10-CM

## 2019-02-26 DIAGNOSIS — H35.341 MACULAR HOLE OF RIGHT EYE: ICD-10-CM

## 2019-02-26 DIAGNOSIS — R73.9 HYPERGLYCEMIA: ICD-10-CM

## 2019-02-26 DIAGNOSIS — D69.6 THROMBOCYTOPENIA: Chronic | ICD-10-CM

## 2019-02-26 DIAGNOSIS — I10 ESSENTIAL HYPERTENSION: Chronic | ICD-10-CM

## 2019-02-26 DIAGNOSIS — I70.0 CALCIFICATION OF AORTA: Chronic | ICD-10-CM

## 2019-02-26 NOTE — TELEPHONE ENCOUNTER
----- Message from Deysi Burnham sent at 2/26/2019  8:45 AM CST -----  Contact: [facility] - [facility]i  Type:  Needs Medical Advice    Who Called: Cori  Symptoms (please be specific): Dementia  Frequent falls  How long has patient had these symptoms: ?  Pharmacy name and phone #: -  Would the patient rather a call back or a response via [facility]? Call  Best Call Back Number: [phone]  Additional Information: Requesting  service for patient per family. Please call ASAP. THANKS, ARIANA

## 2019-02-26 NOTE — TELEPHONE ENCOUNTER
HH, increase/worsening dementia and having frequent falls. The daughter would like to have HH come in. Referral for [facility], skilled nursing, PT/OT for pt? [phone] fax     Send demographics, med list and last ov note when faxing.

## 2019-02-27 ENCOUNTER — OFFICE VISIT (OUTPATIENT)
Dept: NEUROLOGY | Facility: CLINIC | Age: 84
End: 2019-02-27
Payer: MEDICARE

## 2019-02-27 VITALS
SYSTOLIC BLOOD PRESSURE: 150 MMHG | DIASTOLIC BLOOD PRESSURE: 80 MMHG | HEIGHT: 63 IN | WEIGHT: 130.5 LBS | HEART RATE: 66 BPM | BODY MASS INDEX: 23.12 KG/M2

## 2019-02-27 DIAGNOSIS — I10 ESSENTIAL HYPERTENSION: Chronic | ICD-10-CM

## 2019-02-27 DIAGNOSIS — G31.84 MILD COGNITIVE IMPAIRMENT WITH MEMORY LOSS: Primary | Chronic | ICD-10-CM

## 2019-02-27 PROCEDURE — 99999 PR PBB SHADOW E&M-EST. PATIENT-LVL III: CPT | Mod: PBBFAC,HCNC,, | Performed by: PSYCHIATRY & NEUROLOGY

## 2019-02-27 PROCEDURE — 1101F PR PT FALLS ASSESS DOC 0-1 FALLS W/OUT INJ PAST YR: ICD-10-PCS | Mod: HCNC,CPTII,S$GLB, | Performed by: PSYCHIATRY & NEUROLOGY

## 2019-02-27 PROCEDURE — 3077F SYST BP >= 140 MM HG: CPT | Mod: HCNC,CPTII,S$GLB, | Performed by: PSYCHIATRY & NEUROLOGY

## 2019-02-27 PROCEDURE — 99999 PR PBB SHADOW E&M-EST. PATIENT-LVL III: ICD-10-PCS | Mod: PBBFAC,HCNC,, | Performed by: PSYCHIATRY & NEUROLOGY

## 2019-02-27 PROCEDURE — 99214 PR OFFICE/OUTPT VISIT, EST, LEVL IV, 30-39 MIN: ICD-10-PCS | Mod: HCNC,S$GLB,, | Performed by: PSYCHIATRY & NEUROLOGY

## 2019-02-27 PROCEDURE — 3077F PR MOST RECENT SYSTOLIC BLOOD PRESSURE >= 140 MM HG: ICD-10-PCS | Mod: HCNC,CPTII,S$GLB, | Performed by: PSYCHIATRY & NEUROLOGY

## 2019-02-27 PROCEDURE — 1101F PT FALLS ASSESS-DOCD LE1/YR: CPT | Mod: HCNC,CPTII,S$GLB, | Performed by: PSYCHIATRY & NEUROLOGY

## 2019-02-27 PROCEDURE — 3079F PR MOST RECENT DIASTOLIC BLOOD PRESSURE 80-89 MM HG: ICD-10-PCS | Mod: HCNC,CPTII,S$GLB, | Performed by: PSYCHIATRY & NEUROLOGY

## 2019-02-27 PROCEDURE — 99214 OFFICE O/P EST MOD 30 MIN: CPT | Mod: HCNC,S$GLB,, | Performed by: PSYCHIATRY & NEUROLOGY

## 2019-02-27 PROCEDURE — 3079F DIAST BP 80-89 MM HG: CPT | Mod: HCNC,CPTII,S$GLB, | Performed by: PSYCHIATRY & NEUROLOGY

## 2019-02-27 RX ORDER — MEMANTINE HYDROCHLORIDE 5 MG-10 MG
KIT ORAL
Qty: 60 TABLET | Refills: 0 | Status: SHIPPED | OUTPATIENT
Start: 2019-02-27 | End: 2019-03-28 | Stop reason: SDUPTHER

## 2019-02-27 NOTE — PROGRESS NOTES
Subjective:      Patient ID: Soren Gasca is a 85 y.o. female.    Chief Complaint: Follow-up for cognitive impairment or early amnestic form of Alzheimer disease    The patient and her daughter return today with the patient's daughter providing the history indicating that the memory in the patient is continuing to deteriorate.  The patient apparently is unable to recall events that occur within the day or from 1 day to the next.  She remains independent during the day although a granddaughter lives with her at night.  During the day, the patient says is mostly inactive watching television.  However she remains independent for dressing, bathing, and hygiene.  She does not attempt to prepare food other than perhaps to make a sandwich.  The family is providing her with meals that she can eat when they are not in attendance.  The granddaughter apparently is in charge of  administration of medication.    The family has not noticed there to be any evidence of active hallucination.  She is at times somewhat confused in terms of speech.  The patient is independent for ambulation and has not had any falls.  She has not had any seizure or unexplained loss of consciousness.  She has not had any episodes of incontinence.  The family is committed to trying to keep her in her own home as long as possible.          ROS:  GENERAL: NO FEVER, CHILLS, FATIGABILITY OR WEIGHT LOSS.  SKIN: NO RASHES, ITCHING OR CHANGES IN COLOR OR TEXTURE OF SKIN.  HEAD: NO HEADACHES OR RECENT HEAD TRAUMA.  EYES: VISUAL ACUITY FINE. NO PHOTOPHOBIA, OCULAR PAIN OR DIPLOPIA.  EARS: DENIES EAR PAIN, DISCHARGE OR VERTIGO.  NOSE: NO LOSS OF SMELL, NO EPISTAXIS OR POSTNASAL DRIP.  MOUTH & THROAT: NO HOARSENESS OR CHANGE IN VOICE. NO EXCESSIVE GUM BLEEDING.  NODES: DENIES SWOLLEN GLANDS.  CHEST: DENIES BETANCUR, CYANOSIS, WHEEZING, COUGH AND SPUTUM PRODUCTION.  CARDIOVASCULAR: DENIES CHEST PAIN, PND, ORTHOPNEA   ABDOMEN: APPETITE FINE. NO WEIGHT LOSS. DENIES  DIARRHEA, ABDOMINAL PAIN, HEMATEMESIS OR BLOOD IN STOOL.  URINARY: NO FLANK PAIN, DYSURIA OR HEMATURIA.  PERIPHERAL VASCULAR: NO CLAUDICATION OR CYANOSIS.  MUSCULOSKELETAL: NO JOINT STIFFNESS OR SWELLING. DENIES BACK PAIN.  NEUROLOGIC: NO HISTORY OF SEIZURES, PARALYSIS, ALTERATION OF GAIT OR COORDINATION.    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     Family History   Problem Relation Age of Onset    Heart defect Mother     Cancer Father      Social History     Socioeconomic History    Marital status:      Spouse name: Not on file    Number of children: 2    Years of education: Not on file    Highest education level: Not on file   Social Needs    Financial resource strain: Not on file    Food insecurity - worry: Not on file    Food insecurity - inability: Not on file    Transportation needs - medical: Not on file    Transportation needs - non-medical: Not on file   Occupational History    Occupation: retired   Tobacco Use    Smoking status: Never Smoker    Smokeless tobacco: Never Used   Substance and Sexual Activity    Alcohol use: No    Drug use: No    Sexual activity: Not Currently   Other Topics Concern    Not on file   Social History Narrative    Not on file         Objective:   PE:   VITAL SIGNS:   HEIGHT 5 FT 3 IN, WEIGHT 59.2 KG, BMI 23.12  Vitals:    02/27/19 0842   BP: (!) 150/80   Pulse: 66     APPEARANCE: WELL NOURISHED, WELL DEVELOPED, IN NO ACUTE DISTRESS. THE PATIENT IS  VERY CLEAN AND NEATLY DRESSED.    HEAD: NORMOCEPHALIC, ATRAUMATIC.  EYES: PERRL. EOMI.  NON-ICTERIC SCLERAE.    EARS: TM'S INTACT.   NOSE: MUCOSA PINK. AIRWAY CLEAR.  MOUTH & THROAT: NO TONSILLAR ENLARGEMENT. NO PHARYNGEAL ERYTHEMA OR EXUDATE. NO STRIDOR.  NECK: SUPPLE. NO BRUITS.  CHEST: LUNGS CLEAR TO AUSCULTATION.  CARDIOVASCULAR: REGULAR RHYTHM WITHOUT SIGNIFICANT MURMURS.  ABDOMEN: BOWEL SOUNDS NORMAL. NOT DISTENDED.  MUSCULOSKELETAL:  NO BONY DEFORMITY SEEN.  MUSCLE TONE IS NORMAL.  MUSCLE MASS IS NORMAL.    NEUROLOGIC:   MENTAL STATUS:    THE PATIENT IS ATTENTIVE TO THE ENVIRONMENT AND COOPERATIVE FOR THE EXAM.  THE PATIENT WAS UNABLE TO NAME THE CITY.  SHE WAS UNABLE TO NAME THE DAY OF THE WEEK.  SHE WAS UNABLE TO RECALL 3 UNRELATED WORDS.  SHE DOES HAVE DIFFICULTY WITH SPEECH PRODUCTION, FREQUENTLY PAUSING TO THINK OF A WORD IN CONVERSATION.  SHE HAS A TENDENCY TO BE REPETITIOUS IN HER COMMENTS.  SHE IS ABLE HOWEVER TO FOLLOW 2 STEP COMMANDS WITH VERBAL CUES.  CRANIAL NERVES: II-XII GROSSLY INTACT. FUNDOSCOPIC EXAM IS NORMAL.  NO HEMORRHAGE, EXUDATE OR PAPILLEDEMA IS PRESENT. THE EXTRAOCULAR MUSCLES ARE INTACT IN THE CARDINAL DIRECTIONS OF GAZE.  NO PTOSIS IS PRESENT. FACIAL FEATURES ARE SYMMETRICAL.  .  TONGUE PROTRUDES IN THE MIDLINE.    GAIT AND STATION:  ROMBERG IS NEGATIVE.  GOOD ALTERNATE ARMSWING WITH NORMAL GAIT.  MOTOR:  NO DOWNDRIFT OF EITHER ARM WHEN HELD AT SHOULDER LEVEL.  MANUAL MUSCLE TESTING OF PROXIMAL AND DISTAL MUSCLES OF BOTH UPPER AND LOWER EXTREMITIES IS NORMAL. MUSCLE MASS IS NORMAL.  MUSCLE TONE IS NORMAL.  SENSORY:  INTACT BOTH UPPER AND LOWER EXTREMITIES TO PIN PRICK, TOUCH, AND VIBRATION.  CEREBELLAR:  FINGER TO NOSE DONE WELL.  ALTERNATING MOVEMENTS INTACT.  NO INVOLUNTARY MOVEMENTS OR TREMOR SEEN.  REFLEXES:  STRETCH REFLEXES ARE 2+ BOTH UPPER AND LOWER EXTREMITIES.  PLANTAR STIMULATION IS FLEXOR BILATERALLY AND NO PATHOLOGICAL REFLEXES ARE SEEN              Assessment:      Encounter Diagnoses   Name Primary?   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  At the present time, she does require some supervision but can also be independent for short periods of time.  The family is actively involved in her care.      Plan:     1. Continue Aricept as previously prescribed  2. Start Namenda with a titration pack advancing to 10 mg twice a day.  3.  Routine follow-up with Neurology in 6 months.    This was a 35 min visit with the patient and her daughter with over 50% of the time spent counseling the daughter regarding the advancing form of her Alzheimer's like dementia.  Medications were discussed in detail.  This note is generated with speech recognition software and is subject to transcription error and sound alike phrases that may be missed by proofreading.

## 2019-03-01 ENCOUNTER — OFFICE VISIT (OUTPATIENT)
Dept: OPHTHALMOLOGY | Facility: CLINIC | Age: 84
End: 2019-03-01
Payer: MEDICARE

## 2019-03-01 DIAGNOSIS — H35.341 MACULAR HOLE OF RIGHT EYE: Primary | ICD-10-CM

## 2019-03-01 DIAGNOSIS — Z98.890 HISTORY OF REPAIR OF RETINAL TEAR BY LASER PHOTOCOAGULATION: ICD-10-CM

## 2019-03-01 PROCEDURE — 92014 PR EYE EXAM, EST PATIENT,COMPREHESV: ICD-10-PCS | Mod: HCNC,S$GLB,, | Performed by: OPHTHALMOLOGY

## 2019-03-01 PROCEDURE — 92134 CPTRZ OPH DX IMG PST SGM RTA: CPT | Mod: HCNC,S$GLB,, | Performed by: OPHTHALMOLOGY

## 2019-03-01 PROCEDURE — 92134 POSTERIOR SEGMENT OCT RETINA (OCULAR COHERENCE TOMOGRAPHY)-BOTH EYES: ICD-10-PCS | Mod: HCNC,S$GLB,, | Performed by: OPHTHALMOLOGY

## 2019-03-01 PROCEDURE — 99999 PR PBB SHADOW E&M-EST. PATIENT-LVL II: ICD-10-PCS | Mod: PBBFAC,HCNC,, | Performed by: OPHTHALMOLOGY

## 2019-03-01 PROCEDURE — 99999 PR PBB SHADOW E&M-EST. PATIENT-LVL II: CPT | Mod: PBBFAC,HCNC,, | Performed by: OPHTHALMOLOGY

## 2019-03-01 PROCEDURE — 92014 COMPRE OPH EXAM EST PT 1/>: CPT | Mod: HCNC,S$GLB,, | Performed by: OPHTHALMOLOGY

## 2019-03-01 NOTE — PROGRESS NOTES
===============================  03/01/2019   Soren Gasca,   85 y.o. female   Last visit [facility]: :5/15/2018   Last visit eye dept. Visit date not found  [facility]:  Uncorrected distance visual acuity was 20/80 in the right eye and 20/70 in the left eye.  Tonometry     Tonometry (Applanation, 10:08 AM)       Right Left    Pressure 16 16              Wearing Rx     Wearing Rx       Sphere Cylinder Axis Add    Right +0.50 +1.50 005 +3.00    Left [place] +2.25 010 +3.00    Type:  PAL              Manifest Refraction     Manifest Refraction       Sphere Cylinder Axis Dist [facility]    Right +0.75 +1.25 180 20/50    Left -0.50 +2.25 010 20/30              Chief Complaint   Patient presents with    macular hole of right eye     PT HAVING BLURRY VISION/ pt broke her current glasses and she needs a new rx today    Macular Degeneration    ERM        HPI     macular hole of right eye      Additional comments: PT HAVING BLURRY VISION/ pt broke her current   glasses and she needs a new rx today              Comments     1.) OD ERM  2.) OD Old mac hole / mac pexy / laser indirect 1/2011 dr laws  3.) Pciol ou YAG OS 6/1/16  4.) SMD          Last edited by Olya Gallegos on 3/1/2019  9:58 AM. (History)          ________________  3/1/2019  Problem List Items Addressed This Visit        Eye/Vision problems    Macular hole - Primary    Relevant Orders    Posterior Segment OCT Retina-Both eyes (Completed)       Other    History of repair of retinal tear by laser photocoagulation    Relevant Orders    Posterior Segment OCT Retina-Both eyes (Completed)          .stable  New glasses  rtc 1 year       ===========================

## 2019-03-28 RX ORDER — MEMANTINE HYDROCHLORIDE 5 MG-10 MG
KIT ORAL
Qty: 60 TABLET | Refills: 11 | Status: SHIPPED | OUTPATIENT
Start: 2019-03-28 | End: 2019-07-16

## 2019-03-28 NOTE — TELEPHONE ENCOUNTER
----- Message from Ishmael Bowie sent at 3/28/2019  7:59 AM CDT -----  Contact: pt daughter - Lesley Jay   Type:  RX Refill Request    Who Called:  Pt   Refill or New Rx:refill   RX Name and Strength:memantine 10 mg   How is the patient currently taking it? (ex. 1XDay):twice a day   Is this a 30 day or 90 day RX:  Preferred Pharmacy with phone number:  Local or Mail Order: local   Ordering Provider:bar   Would the patient rather a call back or a response via [facility]? Phone   Best Call Back Number:[phone]  Additional Information: States she was informed to contact the nurse when pt was about 4 days out of running out of her meds as told to her by the / jose that he will call another dosage in for the pt per ms lesley [facility] [address] [place] [address] [place] - [address] [facility]  [address] [address] [facility]  [place] [address]  Phone: [phone] Fax: [phone]

## 2019-04-15 ENCOUNTER — TELEPHONE (OUTPATIENT)
Dept: ADMINISTRATIVE | Facility: CLINIC | Age: 84
End: 2019-04-15

## 2019-04-15 NOTE — TELEPHONE ENCOUNTER
Home Health SOC 02/27/2019 - 04/27/2019 with [facility] (Brian Rocha) - Dr. Monica Jones. Patient received SN, PT and OT services.

## 2019-04-26 ENCOUNTER — TELEPHONE (OUTPATIENT)
Dept: FAMILY MEDICINE | Facility: CLINIC | Age: 84
End: 2019-04-26

## 2019-04-26 NOTE — TELEPHONE ENCOUNTER
----- Message from Stacey Luu sent at 4/26/2019 10:46 AM CDT -----  Contact: daughter   She's calling in regards to RS nurse visit     pls call back at [phone] (lesley daughter)     CALLED PT DAUGHTER LESLEY TO RESCHEDULE PT FOR 7/11/19..

## 2019-06-25 ENCOUNTER — PATIENT MESSAGE (OUTPATIENT)
Dept: NEUROLOGY | Facility: CLINIC | Age: 84
End: 2019-06-25

## 2019-06-30 ENCOUNTER — PATIENT MESSAGE (OUTPATIENT)
Dept: NEUROLOGY | Facility: CLINIC | Age: 84
End: 2019-06-30

## 2019-07-01 ENCOUNTER — PATIENT MESSAGE (OUTPATIENT)
Dept: NEUROLOGY | Facility: CLINIC | Age: 84
End: 2019-07-01

## 2019-07-16 ENCOUNTER — OFFICE VISIT (OUTPATIENT)
Dept: INTERNAL MEDICINE | Facility: CLINIC | Age: 84
End: 2019-07-16
Payer: MEDICARE

## 2019-07-16 VITALS
SYSTOLIC BLOOD PRESSURE: 128 MMHG | TEMPERATURE: 98 F | BODY MASS INDEX: 23.75 KG/M2 | HEART RATE: 80 BPM | WEIGHT: 134.06 LBS | HEIGHT: 63 IN | DIASTOLIC BLOOD PRESSURE: 80 MMHG

## 2019-07-16 DIAGNOSIS — I10 ESSENTIAL HYPERTENSION: Chronic | ICD-10-CM

## 2019-07-16 DIAGNOSIS — Z90.81 H/O SPLENECTOMY: Chronic | ICD-10-CM

## 2019-07-16 DIAGNOSIS — M51.37 DDD (DEGENERATIVE DISC DISEASE), LUMBOSACRAL: ICD-10-CM

## 2019-07-16 DIAGNOSIS — G30.1 LATE ONSET ALZHEIMER'S DISEASE WITHOUT BEHAVIORAL DISTURBANCE: Primary | ICD-10-CM

## 2019-07-16 DIAGNOSIS — D69.6 THROMBOCYTOPENIA: Chronic | ICD-10-CM

## 2019-07-16 DIAGNOSIS — F02.80 LATE ONSET ALZHEIMER'S DISEASE WITHOUT BEHAVIORAL DISTURBANCE: Primary | ICD-10-CM

## 2019-07-16 DIAGNOSIS — I70.0 CALCIFICATION OF AORTA: Chronic | ICD-10-CM

## 2019-07-16 PROBLEM — Z79.890 POSTMENOPAUSAL HORMONE REPLACEMENT THERAPY: Status: RESOLVED | Noted: 2017-01-03 | Resolved: 2019-07-16

## 2019-07-16 PROCEDURE — 99999 PR PBB SHADOW E&M-EST. PATIENT-LVL III: ICD-10-PCS | Mod: PBBFAC,HCNC,, | Performed by: FAMILY MEDICINE

## 2019-07-16 PROCEDURE — 1101F PT FALLS ASSESS-DOCD LE1/YR: CPT | Mod: HCNC,CPTII,S$GLB, | Performed by: FAMILY MEDICINE

## 2019-07-16 PROCEDURE — 99999 PR PBB SHADOW E&M-EST. PATIENT-LVL III: CPT | Mod: PBBFAC,HCNC,, | Performed by: FAMILY MEDICINE

## 2019-07-16 PROCEDURE — 3074F PR MOST RECENT SYSTOLIC BLOOD PRESSURE < 130 MM HG: ICD-10-PCS | Mod: HCNC,CPTII,S$GLB, | Performed by: FAMILY MEDICINE

## 2019-07-16 PROCEDURE — 3079F DIAST BP 80-89 MM HG: CPT | Mod: HCNC,CPTII,S$GLB, | Performed by: FAMILY MEDICINE

## 2019-07-16 PROCEDURE — 99214 OFFICE O/P EST MOD 30 MIN: CPT | Mod: HCNC,S$GLB,, | Performed by: FAMILY MEDICINE

## 2019-07-16 PROCEDURE — 3079F PR MOST RECENT DIASTOLIC BLOOD PRESSURE 80-89 MM HG: ICD-10-PCS | Mod: HCNC,CPTII,S$GLB, | Performed by: FAMILY MEDICINE

## 2019-07-16 PROCEDURE — 1101F PR PT FALLS ASSESS DOC 0-1 FALLS W/OUT INJ PAST YR: ICD-10-PCS | Mod: HCNC,CPTII,S$GLB, | Performed by: FAMILY MEDICINE

## 2019-07-16 PROCEDURE — 3074F SYST BP LT 130 MM HG: CPT | Mod: HCNC,CPTII,S$GLB, | Performed by: FAMILY MEDICINE

## 2019-07-16 PROCEDURE — 99214 PR OFFICE/OUTPT VISIT, EST, LEVL IV, 30-39 MIN: ICD-10-PCS | Mod: HCNC,S$GLB,, | Performed by: FAMILY MEDICINE

## 2019-07-16 RX ORDER — LIDOCAINE, MENTHOL 4; 1 G/100G; G/100G
PATCH TOPICAL
Qty: 15 PATCH | Refills: 11 | Status: SHIPPED | OUTPATIENT
Start: 2019-07-16 | End: 2020-05-27

## 2019-07-16 NOTE — PROGRESS NOTES
Subjective:      Patient ID: Soren Gasca is a 85 y.o. female.    Chief Complaint: Follow-up (6m)    Disclaimer:  This note is prepared using voice recognition software and as such is likely to have errors and has not been proof read. Please contact me for questions.     Here today for 6 month checkup.  Did come off the namenda recently. Didn't find that it helped any.     [place] [facility] [place]    Did get an Echo Spot to have at her home.     Lower back pain chronically. May have her granddaughter to place on her back in the am with her morning meds.     doingw ell off the estrogen. No complaints.   annual checkup but lately her memory has gotten really really bad. Not remembering to ttake her meds lately. Has pill box, but some are doubled up and some things aren't there. Can't seem to handle the medciations anymore.     Does still have off and on stomach issues. Wants to stay on full dose of protonix.     Has been given note/letter by dr dinero consistent with dementia that she could participate with [facility].    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      Lab Results   Component Value Date    WBC 10.73 01/15/2019    HGB 12.4 01/15/2019    HCT 38.1 01/15/2019     01/15/2019    CHOL 233 (H) 01/15/2019    TRIG 188 (H) 01/15/2019    HDL 72 01/15/2019    ALT 13 01/15/2019    AST 31 01/15/2019     01/15/2019    K 3.9 01/15/2019     01/15/2019    CREATININE 0.8 01/15/2019    BUN 17 01/15/2019    CO2 29 01/15/2019    TSH 1.236 01/15/2019    INR 1.0 12/03/2009    HGBA1C 5.8 05/02/2017             Review of Systems   Constitutional: Negative for chills, fatigue and fever.   HENT: Negative for ear pain and trouble swallowing.    Eyes: Negative for pain and visual disturbance.   Respiratory: Negative for cough and shortness of breath.    Cardiovascular: Negative for chest pain and leg swelling.   Gastrointestinal: Negative for abdominal pain,  "blood in stool, nausea and vomiting.   Endocrine: Negative for cold intolerance and heat intolerance.   Genitourinary: Negative for dysuria and frequency.   Musculoskeletal: Positive for back pain. Negative for joint swelling, myalgias and neck pain.   Skin: Negative for color change and rash.   Neurological: Negative for dizziness and headaches.   Psychiatric/Behavioral: Positive for confusion. Negative for behavioral problems and sleep disturbance.     Objective:     Vitals:    07/16/19 1023   BP: 128/80   Pulse: 80   Temp: 97.6 °F (36.4 °C)   Weight: 60.8 kg (134 lb 0.6 oz)   Height: 5' 3" (1.6 m)     Physical Exam   Constitutional: She is oriented to person, place, and time. She appears well-developed and well-nourished.   HENT:   Head: Normocephalic and atraumatic.   Right Ear: External ear normal.   Left Ear: External ear normal.   Mouth/Throat: Oropharynx is clear and moist.   Eyes: EOM are normal.   Neck: Normal range of motion. Neck supple. No thyromegaly present.   Cardiovascular: Normal rate and regular rhythm. Exam reveals no gallop and no friction rub.   No murmur heard.  Pulmonary/Chest: Effort normal. No respiratory distress. She has no wheezes. She has no rales.   Abdominal: Soft. Bowel sounds are normal. She exhibits no distension. There is no tenderness. There is no rebound.   Musculoskeletal: Normal range of motion. She exhibits no edema.   Lymphadenopathy:     She has no cervical adenopathy.   Neurological: She is alert and oriented to person, place, and time.   Skin: Skin is warm and dry. No rash noted.   Psychiatric: She has a normal mood and affect. Her behavior is normal. Judgment and thought content normal.   Vitals reviewed.    Assessment:     1. Late onset Alzheimer's disease without behavioral disturbance    2. DDD (degenerative disc disease), lumbosacral    3. Thrombocytopenia    4. Essential hypertension    5. H/O splenectomy    6. Calcification of aorta      Plan:   Soren was seen " today for follow-up.    Diagnoses and all orders for this visit:    Late onset Alzheimer's disease without behavioral disturbance- agree with minimizing meds and potential for [facility]. On aricept. Stopped namenda    DDD (degenerative disc disease), lumbosacral- trial of patch ( otc with menthol and lidocaine) to back each am.     Thrombocytopenia - stable, Continue with current medications and interventions. Labs reviewed.       Essential hypertension- stable, Continue with current medications and interventions. Labs reviewed.     H/O splenectomy- stable, Continue with current medications and interventions. Labs reviewed.     Calcification of aorta- stable, Continue with current medications and interventions. Labs reviewed.  Does not need statin due to age > 85    Other orders  -     lidocaine-menthol 4-1 % PtMd; To apply to back as patch daily for lower back pain            Follow up in about 6 months (around 1/16/2020) for physical with Dr CASANOVA.    Patient Instructions   Shingrix vaccine is the new shingles vaccine. Ask at pharmacy.  2 shots, not live, covered by insurance. 90 % effective against Shingles. Can start it now at age 50. Not doing the old Zostavax anymore. Even if you got zostavax, it is recommended to get the new shingles vaccine.

## 2019-11-18 ENCOUNTER — OFFICE VISIT (OUTPATIENT)
Dept: INTERNAL MEDICINE | Facility: CLINIC | Age: 84
End: 2019-11-18
Payer: MEDICARE

## 2019-11-18 VITALS
DIASTOLIC BLOOD PRESSURE: 80 MMHG | SYSTOLIC BLOOD PRESSURE: 130 MMHG | HEART RATE: 77 BPM | BODY MASS INDEX: 23.91 KG/M2 | WEIGHT: 134.94 LBS | TEMPERATURE: 98 F | HEIGHT: 63 IN

## 2019-11-18 DIAGNOSIS — R07.89 OTHER CHEST PAIN: Primary | ICD-10-CM

## 2019-11-18 DIAGNOSIS — N64.4 BREAST PAIN, LEFT: ICD-10-CM

## 2019-11-18 PROCEDURE — 99213 OFFICE O/P EST LOW 20 MIN: CPT | Mod: HCNC,S$GLB,, | Performed by: PHYSICIAN ASSISTANT

## 2019-11-18 PROCEDURE — 1100F PR PT FALLS ASSESS DOC 2+ FALLS/FALL W/INJURY/YR: ICD-10-PCS | Mod: HCNC,CPTII,S$GLB, | Performed by: PHYSICIAN ASSISTANT

## 2019-11-18 PROCEDURE — 1100F PTFALLS ASSESS-DOCD GE2>/YR: CPT | Mod: HCNC,CPTII,S$GLB, | Performed by: PHYSICIAN ASSISTANT

## 2019-11-18 PROCEDURE — 3288F PR FALLS RISK ASSESSMENT DOCUMENTED: ICD-10-PCS | Mod: HCNC,CPTII,S$GLB, | Performed by: PHYSICIAN ASSISTANT

## 2019-11-18 PROCEDURE — 93005 ELECTROCARDIOGRAM TRACING: CPT | Mod: HCNC,S$GLB,, | Performed by: PHYSICIAN ASSISTANT

## 2019-11-18 PROCEDURE — 99999 PR PBB SHADOW E&M-EST. PATIENT-LVL IV: ICD-10-PCS | Mod: PBBFAC,HCNC,, | Performed by: PHYSICIAN ASSISTANT

## 2019-11-18 PROCEDURE — 93010 EKG 12-LEAD: ICD-10-PCS | Mod: HCNC,S$GLB,, | Performed by: INTERNAL MEDICINE

## 2019-11-18 PROCEDURE — 99999 PR PBB SHADOW E&M-EST. PATIENT-LVL IV: CPT | Mod: PBBFAC,HCNC,, | Performed by: PHYSICIAN ASSISTANT

## 2019-11-18 PROCEDURE — 93005 EKG 12-LEAD: ICD-10-PCS | Mod: HCNC,S$GLB,, | Performed by: PHYSICIAN ASSISTANT

## 2019-11-18 PROCEDURE — 3288F FALL RISK ASSESSMENT DOCD: CPT | Mod: HCNC,CPTII,S$GLB, | Performed by: PHYSICIAN ASSISTANT

## 2019-11-18 PROCEDURE — 99213 PR OFFICE/OUTPT VISIT, EST, LEVL III, 20-29 MIN: ICD-10-PCS | Mod: HCNC,S$GLB,, | Performed by: PHYSICIAN ASSISTANT

## 2019-11-18 PROCEDURE — 93010 ELECTROCARDIOGRAM REPORT: CPT | Mod: HCNC,S$GLB,, | Performed by: INTERNAL MEDICINE

## 2019-11-18 PROCEDURE — 99499 UNLISTED E&M SERVICE: CPT | Mod: HCNC,S$GLB,, | Performed by: PHYSICIAN ASSISTANT

## 2019-11-18 PROCEDURE — 99499 RISK ADDL DX/OHS AUDIT: ICD-10-PCS | Mod: HCNC,S$GLB,, | Performed by: PHYSICIAN ASSISTANT

## 2019-11-18 NOTE — PROGRESS NOTES
Subjective:       Patient ID: Soren Gasca is a 86 y.o. female.    Chief Complaint: Breast Pain    HPI     Patient comes in today with daugther with left breast pain   Unsure cause    Patient has dementia.     States last week felt a sharp pain in left breast, has been on/off since   No mitigating or exacerbating factors     There are no preventive care reminders to display for this patient.    Past Medical History:   Diagnosis Date    Acid reflux     Arthritis     Dizziness     H/O splenectomy     for ITP    Hearing loss     History of bleeding ulcers     Hypertension     Leg swelling     Macular degeneration     Memory loss     Thrombocytopenia        Current Outpatient Medications   Medication Sig Dispense Refill    acetaminophen (TYLENOL ARTHRITIS PAIN) 650 MG TbSR Take 650 mg by mouth every 8 (eight) hours.      donepezil (ARICEPT) 10 MG tablet Take 1 tablet (10 mg total) by mouth every evening. 90 tablet 3    lidocaine-menthol 4-1 % PtMd To apply to back as patch daily for lower back pain 15 patch 11    meclizine (ANTIVERT) 25 mg tablet Take 1 tablet (25 mg total) by mouth every morning. For vertigo and nausea 90 tablet 3    metoprolol succinate (TOPROL-XL) 25 MG 24 hr tablet Take 0.5-1 tablets (12.5-25 mg total) by mouth every morning. 90 tablet 3    multivitamin-iron-folic acid Tab 1 tablet po am with iron, b12, calcium, vitamin D      pantoprazole (PROTONIX) 40 MG tablet Take 1 tablet (40 mg total) by mouth every morning. 90 tablet 3    mupirocin (BACTROBAN) 2 % ointment Apply topically 3 (three) times daily. To nose (Patient not taking: Reported on 11/18/2019) 30 g 3    triamcinolone acetonide 0.1% (KENALOG) 0.1 % cream APPLY TOPICALLY 2 (TWO) TIMES DAILY. 80 g 0     No current facility-administered medications for this visit.        Review of Systems   Constitutional: Negative for fatigue, fever and unexpected weight change.   HENT: Negative for sore throat and trouble swallowing.   "  Respiratory: Negative for cough and shortness of breath.    Cardiovascular: Positive for chest pain.   Gastrointestinal: Negative for abdominal pain.   Hematological: Negative for adenopathy. Does not bruise/bleed easily.   All other systems reviewed and are negative.      Objective:   /80   Pulse 77   Temp 98 °F (36.7 °C) (Oral)   Ht 5' 3" (1.6 m)   Wt 61.2 kg (134 lb 14.7 oz)   BMI 23.90 kg/m²      Physical Exam   Constitutional: She is oriented to person, place, and time. She appears well-developed and well-nourished. No distress.   HENT:   Head: Normocephalic and atraumatic.   Right Ear: External ear normal.   Eyes: Pupils are equal, round, and reactive to light. EOM are normal.   Neck: Normal range of motion. Neck supple.   Cardiovascular: Normal rate, regular rhythm, normal heart sounds and intact distal pulses.   Pulmonary/Chest: Effort normal and breath sounds normal.       Abdominal: Soft.   Musculoskeletal: She exhibits no edema.   Neurological: She is alert and oriented to person, place, and time.   Skin: Capillary refill takes less than 2 seconds.   Psychiatric: She has a normal mood and affect. Her behavior is normal.         Lab Results   Component Value Date    WBC 10.73 01/15/2019    HGB 12.4 01/15/2019    HCT 38.1 01/15/2019     01/15/2019    CHOL 233 (H) 01/15/2019    TRIG 188 (H) 01/15/2019    HDL 72 01/15/2019    ALT 13 01/15/2019    AST 31 01/15/2019     01/15/2019    K 3.9 01/15/2019     01/15/2019    CREATININE 0.8 01/15/2019    BUN 17 01/15/2019    CO2 29 01/15/2019    TSH 1.236 01/15/2019    INR 1.0 12/03/2009    HGBA1C 5.8 05/02/2017       Assessment:       1. Other chest pain    2. Breast pain, left        Plan:   Other chest pain  -     IN OFFICE EKG 12-LEAD (to Muse)  -     Mammo Digital Diagnostic Bilateral; Future; Expected date: 11/18/2019  -     US Breast Left Limited; Future; Expected date: 11/18/2019    Breast pain, left  -     Mammo Digital " Diagnostic Bilateral; Future; Expected date: 11/18/2019  -     US Breast Left Limited; Future; Expected date: 11/18/2019      Want to evaluate with imaging     No follow-ups on file.

## 2019-11-26 ENCOUNTER — OFFICE VISIT (OUTPATIENT)
Dept: INTERNAL MEDICINE | Facility: CLINIC | Age: 84
End: 2019-11-26
Payer: MEDICARE

## 2019-11-26 ENCOUNTER — HOSPITAL ENCOUNTER (OUTPATIENT)
Dept: RADIOLOGY | Facility: HOSPITAL | Age: 84
Discharge: HOME | End: 2019-11-26
Attending: PHYSICIAN ASSISTANT
Payer: MEDICARE

## 2019-11-26 VITALS
WEIGHT: 132.94 LBS | DIASTOLIC BLOOD PRESSURE: 82 MMHG | HEIGHT: 63 IN | BODY MASS INDEX: 23.55 KG/M2 | SYSTOLIC BLOOD PRESSURE: 126 MMHG | OXYGEN SATURATION: 97 % | TEMPERATURE: 98 F | HEART RATE: 88 BPM

## 2019-11-26 DIAGNOSIS — J40 BRONCHITIS: ICD-10-CM

## 2019-11-26 DIAGNOSIS — R05.9 COUGH: ICD-10-CM

## 2019-11-26 DIAGNOSIS — R05.9 COUGH: Primary | ICD-10-CM

## 2019-11-26 DIAGNOSIS — R06.2 WHEEZING: ICD-10-CM

## 2019-11-26 PROCEDURE — 1126F AMNT PAIN NOTED NONE PRSNT: CPT | Mod: HCNC,S$GLB,, | Performed by: PHYSICIAN ASSISTANT

## 2019-11-26 PROCEDURE — 3288F PR FALLS RISK ASSESSMENT DOCUMENTED: ICD-10-PCS | Mod: HCNC,CPTII,S$GLB, | Performed by: PHYSICIAN ASSISTANT

## 2019-11-26 PROCEDURE — 99214 OFFICE O/P EST MOD 30 MIN: CPT | Mod: HCNC,S$GLB,, | Performed by: PHYSICIAN ASSISTANT

## 2019-11-26 PROCEDURE — 1100F PTFALLS ASSESS-DOCD GE2>/YR: CPT | Mod: HCNC,CPTII,S$GLB, | Performed by: PHYSICIAN ASSISTANT

## 2019-11-26 PROCEDURE — 1159F PR MEDICATION LIST DOCUMENTED IN MEDICAL RECORD: ICD-10-PCS | Mod: HCNC,S$GLB,, | Performed by: PHYSICIAN ASSISTANT

## 2019-11-26 PROCEDURE — 3288F FALL RISK ASSESSMENT DOCD: CPT | Mod: HCNC,CPTII,S$GLB, | Performed by: PHYSICIAN ASSISTANT

## 2019-11-26 PROCEDURE — 71046 X-RAY EXAM CHEST 2 VIEWS: CPT | Mod: TC,HCNC,FY,PO

## 2019-11-26 PROCEDURE — 71046 XR CHEST PA AND LATERAL: ICD-10-PCS | Mod: 26,HCNC,, | Performed by: RADIOLOGY

## 2019-11-26 PROCEDURE — 99999 PR PBB SHADOW E&M-EST. PATIENT-LVL III: CPT | Mod: PBBFAC,HCNC,, | Performed by: PHYSICIAN ASSISTANT

## 2019-11-26 PROCEDURE — 99214 PR OFFICE/OUTPT VISIT, EST, LEVL IV, 30-39 MIN: ICD-10-PCS | Mod: HCNC,S$GLB,, | Performed by: PHYSICIAN ASSISTANT

## 2019-11-26 PROCEDURE — 71046 X-RAY EXAM CHEST 2 VIEWS: CPT | Mod: 26,HCNC,, | Performed by: RADIOLOGY

## 2019-11-26 PROCEDURE — 1100F PR PT FALLS ASSESS DOC 2+ FALLS/FALL W/INJURY/YR: ICD-10-PCS | Mod: HCNC,CPTII,S$GLB, | Performed by: PHYSICIAN ASSISTANT

## 2019-11-26 PROCEDURE — 1159F MED LIST DOCD IN RCRD: CPT | Mod: HCNC,S$GLB,, | Performed by: PHYSICIAN ASSISTANT

## 2019-11-26 PROCEDURE — 99999 PR PBB SHADOW E&M-EST. PATIENT-LVL III: ICD-10-PCS | Mod: PBBFAC,HCNC,, | Performed by: PHYSICIAN ASSISTANT

## 2019-11-26 PROCEDURE — 1126F PR PAIN SEVERITY QUANTIFIED, NO PAIN PRESENT: ICD-10-PCS | Mod: HCNC,S$GLB,, | Performed by: PHYSICIAN ASSISTANT

## 2019-11-27 ENCOUNTER — TELEPHONE (OUTPATIENT)
Dept: RADIOLOGY | Facility: HOSPITAL | Age: 84
End: 2019-11-27

## 2019-11-27 NOTE — PROGRESS NOTES
Subjective:       Patient ID: Soren Gasca is a 86 y.o. female.    Chief Complaint: Chest Congestion and Cough    HPI  Patient comes in today for cough, chest congestion that has happened over the last week and slowly improving     She feels better today     She has dementia, daughter helping with HPI     No fever, no shortness of breath   There are no preventive care reminders to display for this patient.    Past Medical History:   Diagnosis Date    Acid reflux     Arthritis     Dizziness     H/O splenectomy     for ITP    Hearing loss     History of bleeding ulcers     Hypertension     Leg swelling     Macular degeneration     Memory loss     Thrombocytopenia        Current Outpatient Medications   Medication Sig Dispense Refill    acetaminophen (TYLENOL ARTHRITIS PAIN) 650 MG TbSR Take 650 mg by mouth every 8 (eight) hours.      donepezil (ARICEPT) 10 MG tablet Take 1 tablet (10 mg total) by mouth every evening. 90 tablet 3    lidocaine-menthol 4-1 % PtMd To apply to back as patch daily for lower back pain 15 patch 11    meclizine (ANTIVERT) 25 mg tablet Take 1 tablet (25 mg total) by mouth every morning. For vertigo and nausea 90 tablet 3    metoprolol succinate (TOPROL-XL) 25 MG 24 hr tablet Take 0.5-1 tablets (12.5-25 mg total) by mouth every morning. 90 tablet 3    multivitamin-iron-folic acid Tab 1 tablet po am with iron, b12, calcium, vitamin D      mupirocin (BACTROBAN) 2 % ointment Apply topically 3 (three) times daily. To nose 30 g 3    pantoprazole (PROTONIX) 40 MG tablet Take 1 tablet (40 mg total) by mouth every morning. 90 tablet 3    triamcinolone acetonide 0.1% (KENALOG) 0.1 % cream APPLY TOPICALLY 2 (TWO) TIMES DAILY. 80 g 0     No current facility-administered medications for this visit.        Review of Systems   Constitutional: Negative for chills, diaphoresis, fatigue and fever.   HENT: Positive for congestion and rhinorrhea. Negative for ear discharge, ear pain, sinus  "pressure, sneezing and sore throat.    Respiratory: Positive for cough. Negative for chest tightness, shortness of breath and wheezing.    Cardiovascular: Negative for chest pain, palpitations and leg swelling.   Gastrointestinal: Negative for diarrhea, nausea and vomiting.   Genitourinary: Negative for decreased urine volume, dysuria, flank pain, frequency, hematuria, vaginal bleeding, vaginal discharge and vaginal pain.   Musculoskeletal: Negative for myalgias.   Neurological: Negative for dizziness, weakness, light-headedness and headaches.   All other systems reviewed and are negative.      Objective:   /82   Pulse 88   Temp 97.8 °F (36.6 °C) (Oral)   Ht 5' 3" (1.6 m)   Wt 60.3 kg (132 lb 15 oz)   SpO2 97%   BMI 23.55 kg/m²      Physical Exam   Constitutional: She is oriented to person, place, and time. She appears well-developed and well-nourished. No distress.   HENT:   Head: Normocephalic.   Right Ear: Hearing, tympanic membrane, external ear and ear canal normal.   Left Ear: Hearing, tympanic membrane, external ear and ear canal normal.   Nose: Mucosal edema present. Right sinus exhibits no maxillary sinus tenderness and no frontal sinus tenderness. Left sinus exhibits no maxillary sinus tenderness and no frontal sinus tenderness.   Mouth/Throat: Uvula is midline, oropharynx is clear and moist and mucous membranes are normal.   Eyes: Conjunctivae and EOM are normal.   Neck: Normal range of motion. Neck supple.   Cardiovascular: Normal rate, regular rhythm and normal heart sounds.   Pulmonary/Chest: Effort normal and breath sounds normal. No accessory muscle usage. No apnea, no tachypnea and no bradypnea. No respiratory distress. She has no decreased breath sounds. She has no wheezes. She has no rhonchi. She has no rales.   Abdominal: Soft. Normal appearance. There is no CVA tenderness.   Neurological: She is alert and oriented to person, place, and time.   Skin: Skin is warm and dry. She is not " diaphoretic.         Lab Results   Component Value Date    WBC 10.73 01/15/2019    HGB 12.4 01/15/2019    HCT 38.1 01/15/2019     01/15/2019    CHOL 233 (H) 01/15/2019    TRIG 188 (H) 01/15/2019    HDL 72 01/15/2019    ALT 13 01/15/2019    AST 31 01/15/2019     01/15/2019    K 3.9 01/15/2019     01/15/2019    CREATININE 0.8 01/15/2019    BUN 17 01/15/2019    CO2 29 01/15/2019    TSH 1.236 01/15/2019    INR 1.0 12/03/2009    HGBA1C 5.8 05/02/2017       Assessment:       1. Cough    2. Wheezing    3. Bronchitis        Plan:   Cough  -     X-Ray Chest PA And Lateral; Future; Expected date: 11/26/2019    Wheezing  -     X-Ray Chest PA And Lateral; Future; Expected date: 11/26/2019    Bronchitis      Patient has a mild case of bronchitis   Ok to take mucinex DM  Xray clear       No follow-ups on file.

## 2019-12-02 ENCOUNTER — HOSPITAL ENCOUNTER (OUTPATIENT)
Dept: RADIOLOGY | Facility: HOSPITAL | Age: 84
Discharge: HOME | End: 2019-12-02
Attending: PHYSICIAN ASSISTANT
Payer: MEDICARE

## 2019-12-02 VITALS — HEIGHT: 63 IN | BODY MASS INDEX: 23.55 KG/M2 | WEIGHT: 132.94 LBS

## 2019-12-02 DIAGNOSIS — R07.89 OTHER CHEST PAIN: ICD-10-CM

## 2019-12-02 DIAGNOSIS — N64.4 BREAST PAIN, LEFT: ICD-10-CM

## 2019-12-02 PROCEDURE — 77066 DX MAMMO INCL CAD BI: CPT | Mod: TC,HCNC

## 2019-12-02 PROCEDURE — 77066 MAMMO DIGITAL DIAGNOSTIC BILAT WITH TOMOSYNTHESIS_CAD: ICD-10-PCS | Mod: 26,HCNC,, | Performed by: RADIOLOGY

## 2019-12-02 PROCEDURE — 77062 BREAST TOMOSYNTHESIS BI: CPT | Mod: 26,HCNC,, | Performed by: RADIOLOGY

## 2019-12-02 PROCEDURE — 77066 DX MAMMO INCL CAD BI: CPT | Mod: 26,HCNC,, | Performed by: RADIOLOGY

## 2019-12-02 PROCEDURE — 77062 MAMMO DIGITAL DIAGNOSTIC BILAT WITH TOMOSYNTHESIS_CAD: ICD-10-PCS | Mod: 26,HCNC,, | Performed by: RADIOLOGY

## 2019-12-02 NOTE — PROGRESS NOTES
Your mammogram is normal.  Repeat screening is recommended in one year.    Sincerely,    Memo Saldana M.D.        If you would like to review your experience with Dr. Saldana or [facility], please follow the link below:    [email or web address]

## 2020-01-20 ENCOUNTER — OFFICE VISIT (OUTPATIENT)
Dept: INTERNAL MEDICINE | Facility: CLINIC | Age: 85
End: 2020-01-20
Payer: MEDICARE

## 2020-01-20 ENCOUNTER — LAB VISIT (OUTPATIENT)
Dept: LAB | Facility: HOSPITAL | Age: 85
End: 2020-01-20
Attending: FAMILY MEDICINE
Payer: MEDICARE

## 2020-01-20 VITALS
DIASTOLIC BLOOD PRESSURE: 80 MMHG | HEIGHT: 63 IN | TEMPERATURE: 98 F | BODY MASS INDEX: 23.28 KG/M2 | SYSTOLIC BLOOD PRESSURE: 120 MMHG | WEIGHT: 131.38 LBS | HEART RATE: 88 BPM

## 2020-01-20 DIAGNOSIS — Z00.00 ROUTINE GENERAL MEDICAL EXAMINATION AT A HEALTH CARE FACILITY: Primary | ICD-10-CM

## 2020-01-20 DIAGNOSIS — R73.9 HYPERGLYCEMIA: ICD-10-CM

## 2020-01-20 DIAGNOSIS — Z90.81 H/O SPLENECTOMY: ICD-10-CM

## 2020-01-20 DIAGNOSIS — F02.80 LATE ONSET ALZHEIMER'S DISEASE WITHOUT BEHAVIORAL DISTURBANCE: ICD-10-CM

## 2020-01-20 DIAGNOSIS — G30.1 LATE ONSET ALZHEIMER'S DISEASE WITHOUT BEHAVIORAL DISTURBANCE: ICD-10-CM

## 2020-01-20 DIAGNOSIS — I10 ESSENTIAL HYPERTENSION: ICD-10-CM

## 2020-01-20 DIAGNOSIS — E78.5 HYPERLIPIDEMIA, UNSPECIFIED HYPERLIPIDEMIA TYPE: ICD-10-CM

## 2020-01-20 DIAGNOSIS — I70.0 CALCIFICATION OF AORTA: ICD-10-CM

## 2020-01-20 DIAGNOSIS — E55.9 VITAMIN D DEFICIENCY: ICD-10-CM

## 2020-01-20 DIAGNOSIS — D69.6 THROMBOCYTOPENIA: ICD-10-CM

## 2020-01-20 DIAGNOSIS — Z00.00 ROUTINE GENERAL MEDICAL EXAMINATION AT A HEALTH CARE FACILITY: ICD-10-CM

## 2020-01-20 LAB
25(OH)D3+25(OH)D2 SERPL-MCNC: 58 NG/ML (ref 30–96)
ALBUMIN SERPL BCP-MCNC: 4.5 G/DL (ref 3.5–5.2)
ALP SERPL-CCNC: 77 U/L (ref 55–135)
ALT SERPL W/O P-5'-P-CCNC: 22 U/L (ref 10–44)
ANION GAP SERPL CALC-SCNC: 11 MMOL/L (ref 8–16)
AST SERPL-CCNC: 35 U/L (ref 10–40)
BASOPHILS # BLD AUTO: 0.05 K/UL (ref 0–0.2)
BASOPHILS NFR BLD: 0.6 % (ref 0–1.9)
BILIRUB SERPL-MCNC: 0.2 MG/DL (ref 0.1–1)
BUN SERPL-MCNC: 32 MG/DL (ref 8–23)
CALCIUM SERPL-MCNC: 10.2 MG/DL (ref 8.7–10.5)
CHLORIDE SERPL-SCNC: 104 MMOL/L (ref 95–110)
CHOLEST SERPL-MCNC: 229 MG/DL (ref 120–199)
CHOLEST/HDLC SERPL: 3.4 {RATIO} (ref 2–5)
CO2 SERPL-SCNC: 27 MMOL/L (ref 23–29)
CREAT SERPL-MCNC: 0.9 MG/DL (ref 0.5–1.4)
DIFFERENTIAL METHOD: ABNORMAL
EOSINOPHIL # BLD AUTO: 0.1 K/UL (ref 0–0.5)
EOSINOPHIL NFR BLD: 1 % (ref 0–8)
ERYTHROCYTE [DISTWIDTH] IN BLOOD BY AUTOMATED COUNT: 14.5 % (ref 11.5–14.5)
EST. GFR  (AFRICAN AMERICAN): >60 ML/MIN/1.73 M^2
EST. GFR  (NON AFRICAN AMERICAN): 58.1 ML/MIN/1.73 M^2
ESTIMATED AVG GLUCOSE: 117 MG/DL (ref 68–131)
GLUCOSE SERPL-MCNC: 100 MG/DL (ref 70–110)
HBA1C MFR BLD HPLC: 5.7 % (ref 4–5.6)
HCT VFR BLD AUTO: 39.1 % (ref 37–48.5)
HDLC SERPL-MCNC: 67 MG/DL (ref 40–75)
HDLC SERPL: 29.3 % (ref 20–50)
HGB BLD-MCNC: 12.2 G/DL (ref 12–16)
IMM GRANULOCYTES # BLD AUTO: 0.02 K/UL (ref 0–0.04)
IMM GRANULOCYTES NFR BLD AUTO: 0.2 % (ref 0–0.5)
LDLC SERPL CALC-MCNC: 133.2 MG/DL (ref 63–159)
LYMPHOCYTES # BLD AUTO: 4.2 K/UL (ref 1–4.8)
LYMPHOCYTES NFR BLD: 47.8 % (ref 18–48)
MCH RBC QN AUTO: 30.7 PG (ref 27–31)
MCHC RBC AUTO-ENTMCNC: 31.2 G/DL (ref 32–36)
MCV RBC AUTO: 99 FL (ref 82–98)
MONOCYTES # BLD AUTO: 0.7 K/UL (ref 0.3–1)
MONOCYTES NFR BLD: 8.1 % (ref 4–15)
NEUTROPHILS # BLD AUTO: 3.7 K/UL (ref 1.8–7.7)
NEUTROPHILS NFR BLD: 42.3 % (ref 38–73)
NONHDLC SERPL-MCNC: 162 MG/DL
NRBC BLD-RTO: 0 /100 WBC
PLATELET # BLD AUTO: 254 K/UL (ref 150–350)
PMV BLD AUTO: 12.1 FL (ref 9.2–12.9)
POTASSIUM SERPL-SCNC: 4.2 MMOL/L (ref 3.5–5.1)
PROT SERPL-MCNC: 8 G/DL (ref 6–8.4)
RBC # BLD AUTO: 3.97 M/UL (ref 4–5.4)
SODIUM SERPL-SCNC: 142 MMOL/L (ref 136–145)
T4 FREE SERPL-MCNC: 1.01 NG/DL (ref 0.71–1.51)
TRIGL SERPL-MCNC: 144 MG/DL (ref 30–150)
TSH SERPL DL<=0.005 MIU/L-ACNC: 1.23 UIU/ML (ref 0.4–4)
WBC # BLD AUTO: 8.77 K/UL (ref 3.9–12.7)

## 2020-01-20 PROCEDURE — 99999 PR PBB SHADOW E&M-EST. PATIENT-LVL III: CPT | Mod: PBBFAC,HCNC,, | Performed by: FAMILY MEDICINE

## 2020-01-20 PROCEDURE — 99499 UNLISTED E&M SERVICE: CPT | Mod: S$GLB,,, | Performed by: FAMILY MEDICINE

## 2020-01-20 PROCEDURE — 82306 VITAMIN D 25 HYDROXY: CPT | Mod: HCNC

## 2020-01-20 PROCEDURE — 90732 PPSV23 VACC 2 YRS+ SUBQ/IM: CPT | Mod: HCNC,S$GLB,, | Performed by: FAMILY MEDICINE

## 2020-01-20 PROCEDURE — 99397 PER PM REEVAL EST PAT 65+ YR: CPT | Mod: HCNC,25,S$GLB, | Performed by: FAMILY MEDICINE

## 2020-01-20 PROCEDURE — 99999 PR PBB SHADOW E&M-EST. PATIENT-LVL III: ICD-10-PCS | Mod: PBBFAC,HCNC,, | Performed by: FAMILY MEDICINE

## 2020-01-20 PROCEDURE — 85025 COMPLETE CBC W/AUTO DIFF WBC: CPT | Mod: HCNC

## 2020-01-20 PROCEDURE — 80061 LIPID PANEL: CPT | Mod: HCNC

## 2020-01-20 PROCEDURE — 84439 ASSAY OF FREE THYROXINE: CPT | Mod: HCNC

## 2020-01-20 PROCEDURE — G0009 ADMIN PNEUMOCOCCAL VACCINE: HCPCS | Mod: HCNC,S$GLB,, | Performed by: FAMILY MEDICINE

## 2020-01-20 PROCEDURE — 99499 RISK ADDL DX/OHS AUDIT: ICD-10-PCS | Mod: S$GLB,,, | Performed by: FAMILY MEDICINE

## 2020-01-20 PROCEDURE — 36415 COLL VENOUS BLD VENIPUNCTURE: CPT | Mod: HCNC,PO

## 2020-01-20 PROCEDURE — 90732 PNEUMOCOCCAL POLYSACCHARIDE VACCINE 23-VALENT =>2YO SQ IM: ICD-10-PCS | Mod: HCNC,S$GLB,, | Performed by: FAMILY MEDICINE

## 2020-01-20 PROCEDURE — 99397 PR PREVENTIVE VISIT,EST,65 & OVER: ICD-10-PCS | Mod: HCNC,25,S$GLB, | Performed by: FAMILY MEDICINE

## 2020-01-20 PROCEDURE — 83036 HEMOGLOBIN GLYCOSYLATED A1C: CPT | Mod: HCNC

## 2020-01-20 PROCEDURE — 84443 ASSAY THYROID STIM HORMONE: CPT | Mod: HCNC

## 2020-01-20 PROCEDURE — 80053 COMPREHEN METABOLIC PANEL: CPT | Mod: HCNC

## 2020-01-20 PROCEDURE — G0009 PNEUMOCOCCAL POLYSACCHARIDE VACCINE 23-VALENT =>2YO SQ IM: ICD-10-PCS | Mod: HCNC,S$GLB,, | Performed by: FAMILY MEDICINE

## 2020-01-20 RX ORDER — MECLIZINE HYDROCHLORIDE 25 MG/1
25 TABLET ORAL EVERY MORNING
Qty: 90 TABLET | Refills: 3 | Status: SHIPPED | OUTPATIENT
Start: 2020-01-20 | End: 2020-12-07

## 2020-01-20 RX ORDER — METOPROLOL SUCCINATE 25 MG/1
12.5-25 TABLET, EXTENDED RELEASE ORAL EVERY MORNING
Qty: 90 TABLET | Refills: 3 | Status: SHIPPED | OUTPATIENT
Start: 2020-01-20 | End: 2020-12-07

## 2020-01-20 RX ORDER — PANTOPRAZOLE SODIUM 40 MG/1
40 TABLET, DELAYED RELEASE ORAL EVERY MORNING
Qty: 90 TABLET | Refills: 3 | Status: SHIPPED | OUTPATIENT
Start: 2020-01-20

## 2020-01-20 NOTE — PROGRESS NOTES
Subjective:      Patient ID: Soren Gasca is a 86 y.o. female.    Chief Complaint: Annual Exam    Disclaimer:  This note is prepared using voice recognition software and as such is likely to have errors and has not been proof read. Please contact me for questions.     Here today for 6 month checkup.  Doing [employer] now in [place], [place]. 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  Likes it a lot.  Tues, wed, and Thursday. Gets out of the house, feeding and activities.    Here with her daughter Pat.     8 yo granddaughter Griselda living with them also.     Due for labwork today.     Wt Readings from Last 10 Encounters:  01/20/20 : 59.6 kg (131 lb 6.3 oz)  12/02/19 : 60.3 kg (132 lb 15 oz)  11/26/19 : 60.3 kg (132 lb 15 oz)  11/18/19 : 61.2 kg (134 lb 14.7 oz)  07/16/19 : 60.8 kg (134 lb 0.6 oz)  02/27/19 : 59.2 kg (130 lb 8.2 oz)  01/28/19 : 59.4 kg (131 lb)  01/15/19 : 59.6 kg (131 lb 6.3 oz)  01/07/19 : 60.8 kg (134 lb)  12/15/18 : 61 kg (134 lb 7.7 oz)      Did get an Echo Spot to have at her home.     Lower back pain chronically.     Daughter wants to see about taking her off the Aricept.  Really not finding it to be benefitting her at all.  Finding that she is declining much faster with her cognitive impairment and dementia.  Is still taking the Protonix though.     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  Willing to update her pneumonia shot due to 5 year interval      Lab Results   Component Value Date    WBC 10.73 01/15/2019    HGB 12.4 01/15/2019    HCT 38.1 01/15/2019     01/15/2019    CHOL 233 (H) 01/15/2019    TRIG 188 (H) 01/15/2019    HDL 72 01/15/2019    ALT 13 01/15/2019    AST 31 01/15/2019     01/15/2019    K 3.9 01/15/2019     01/15/2019    CREATININE 0.8 01/15/2019    BUN 17 01/15/2019    CO2 29 01/15/2019    TSH 1.236 01/15/2019    INR 1.0 12/03/2009    HGBA1C 5.8 05/02/2017       Mammo Digital Diagnostic Bilat w/ Levi  Narrative: Result:  Mammo  "Digital Diagnostic Bilat w/ Levi    History:  Patient is 86 y.o. and is seen for a diagnostic mammogram.    Films Compared:  Compared to: 01/07/2019 Mammo Digital Screening Bilat w/ Levi, 01/04/2017   Mammo Digital Diagnostic Bilat with Tomosynthesis_CAD, and 10/22/2015   Mammo Digital Screening Bilat With CAD    Findings:  Computer-aided detection was utilized in the interpretation of this   examination. This procedure was performed using tomosynthesis.       The breasts have scattered areas of fibroglandular density. There is no   evidence of suspicious masses, microcalcifications or architectural   distortion.  Impression: No mammographic evidence of malignancy.    BI-RADS Category 1: Negative    Recommendation:  Routine screening mammogram in 1 year is recommended.        Review of Systems   Constitutional: Negative for activity change, appetite change, fatigue and unexpected weight change.   HENT: Positive for hearing loss. Negative for trouble swallowing and voice change.    Respiratory: Negative for cough and shortness of breath.    Cardiovascular: Negative for chest pain and palpitations.   Gastrointestinal: Negative for abdominal distention, abdominal pain, diarrhea, nausea and vomiting.   Musculoskeletal: Negative for gait problem and myalgias.   Skin: Negative for color change and wound.   Psychiatric/Behavioral: Positive for confusion and decreased concentration. Negative for dysphoric mood and sleep disturbance. The patient is not nervous/anxious.      Objective:     Vitals:    01/20/20 1333   BP: 120/80   Pulse: 88   Temp: 98.3 °F (36.8 °C)   Weight: 59.6 kg (131 lb 6.3 oz)   Height: 5' 3" (1.6 m)     Physical Exam   Constitutional: She is oriented to person, place, and time. She appears well-developed and well-nourished.   HENT:   Head: Normocephalic and atraumatic.   Right Ear: Tympanic membrane and external ear normal.   Left Ear: Tympanic membrane and external ear normal.   Mouth/Throat: Oropharynx " is clear and moist.   Eyes: Conjunctivae and EOM are normal.   Neck: Normal range of motion. Neck supple. No thyromegaly present.   Cardiovascular: Normal rate and regular rhythm. Exam reveals no gallop and no friction rub.   No murmur heard.  Pulmonary/Chest: Effort normal and breath sounds normal. No respiratory distress. She has no wheezes. She has no rales.   Abdominal: Soft. Bowel sounds are normal. She exhibits no distension. There is no tenderness. There is no rebound.   Musculoskeletal: Normal range of motion. She exhibits no edema.   Lymphadenopathy:     She has no cervical adenopathy.   Neurological: She is alert and oriented to person, place, and time.   Skin: Skin is warm and dry. No rash noted.   Psychiatric: She has a normal mood and affect. Her speech is normal and behavior is normal. Judgment and thought content normal. Cognition and memory are impaired. She exhibits abnormal recent memory and abnormal remote memory.   Nursing note and vitals reviewed.    Assessment:     1. Routine general medical examination at a health care facility    2. Late onset Alzheimer's disease without behavioral disturbance    3. Thrombocytopenia    4. Essential hypertension    5. H/O splenectomy    6. Hyperglycemia    7. Hyperlipidemia, unspecified hyperlipidemia type    8. Vitamin D deficiency    9. Calcification of aorta      Plan:   Soren was seen today for annual exam.    Diagnoses and all orders for this visit:    Routine general medical examination at a health care facility-labs today discussed health maintenance update pneumonia vaccine  -     TSH; Future  -     T4, free; Future  -     Lipid panel; Future  -     Microalbumin/creatinine urine ratio  -     Comprehensive metabolic panel; Future  -     CBC auto differential; Future  -     Vitamin D; Future    Late onset Alzheimer's disease without behavioral disturbance okay to stop Aricept as not benefitting the patient okay to continue with [facility] 3 days a  week could increase to 4 days a week if offered.  Greatly benefitting  -     TSH; Future  -     T4, free; Future  -     Lipid panel; Future  -     Microalbumin/creatinine urine ratio  -     Comprehensive metabolic panel; Future  -     CBC auto differential; Future  -     Vitamin D; Future    Thrombocytopenia-lab work today monitoring  -     TSH; Future  -     T4, free; Future  -     Lipid panel; Future  -     Microalbumin/creatinine urine ratio  -     Comprehensive metabolic panel; Future  -     CBC auto differential; Future  -     Vitamin D; Future    Essential hypertension-stable this time with metoprolol  -     TSH; Future  -     T4, free; Future  -     Lipid panel; Future  -     Microalbumin/creatinine urine ratio  -     Comprehensive metabolic panel; Future  -     CBC auto differential; Future  -     Vitamin D; Future    H/O splenectomy update pneumonia vaccine  -     TSH; Future  -     T4, free; Future  -     Lipid panel; Future  -     Microalbumin/creatinine urine ratio  -     Comprehensive metabolic panel; Future  -     CBC auto differential; Future  -     Vitamin D; Future    Hyperglycemia screen for diabetes weight is stable  -     TSH; Future  -     T4, free; Future  -     Lipid panel; Future  -     Microalbumin/creatinine urine ratio  -     Comprehensive metabolic panel; Future  -     CBC auto differential; Future  -     Vitamin D; Future  -     Hemoglobin A1c; Future    Hyperlipidemia, unspecified hyperlipidemia type screen today nonfasting  -     TSH; Future  -     T4, free; Future  -     Lipid panel; Future  -     Microalbumin/creatinine urine ratio  -     Comprehensive metabolic panel; Future  -     CBC auto differential; Future  -     Vitamin D; Future    Vitamin D deficiency screen today  -     Vitamin D; Future    Calcification of aorta on statin therapy    Other orders  -     Pneumococcal Polysaccharide Vaccine (23 Valent) (SQ/IM)  -     pantoprazole (PROTONIX) 40 MG tablet; Take 1 tablet (40 mg  total) by mouth every morning.  -     metoprolol succinate (TOPROL-XL) 25 MG 24 hr tablet; Take 0.5-1 tablets (12.5-25 mg total) by mouth every morning.  -     meclizine (ANTIVERT) 25 mg tablet; Take 1 tablet (25 mg total) by mouth every morning. For vertigo and nausea            Follow up in about 6 months (around 7/20/2020) for chronic issues Dr Jones.    There are no Patient Instructions on file for this visit.

## 2020-01-24 ENCOUNTER — TELEPHONE (OUTPATIENT)
Dept: INTERNAL MEDICINE | Facility: CLINIC | Age: 85
End: 2020-01-24

## 2020-01-24 NOTE — TELEPHONE ENCOUNTER
----- Message from Westley Chambers sent at 1/24/2020  1:00 PM CST -----  Contact: daughter/Ms.Rhonda Skyler Forde called to speak with someone at the office regarding lab results for her mother and would like for the someone from the office to give her a call. She can be reached at    [phone]

## 2020-03-13 ENCOUNTER — TELEPHONE (OUTPATIENT)
Dept: INTERNAL MEDICINE | Facility: CLINIC | Age: 85
End: 2020-03-13

## 2020-03-13 ENCOUNTER — OFFICE VISIT (OUTPATIENT)
Dept: INTERNAL MEDICINE | Facility: CLINIC | Age: 85
End: 2020-03-13
Payer: MEDICARE

## 2020-03-13 VITALS
HEIGHT: 63 IN | WEIGHT: 130.5 LBS | DIASTOLIC BLOOD PRESSURE: 80 MMHG | HEART RATE: 72 BPM | BODY MASS INDEX: 23.12 KG/M2 | OXYGEN SATURATION: 97 % | SYSTOLIC BLOOD PRESSURE: 150 MMHG | TEMPERATURE: 99 F

## 2020-03-13 DIAGNOSIS — G30.1 LATE ONSET ALZHEIMER'S DISEASE WITHOUT BEHAVIORAL DISTURBANCE: ICD-10-CM

## 2020-03-13 DIAGNOSIS — R41.0 CONFUSION: Primary | ICD-10-CM

## 2020-03-13 DIAGNOSIS — F02.80 LATE ONSET ALZHEIMER'S DISEASE WITHOUT BEHAVIORAL DISTURBANCE: ICD-10-CM

## 2020-03-13 DIAGNOSIS — F32.A MILD DEPRESSION: ICD-10-CM

## 2020-03-13 LAB
BILIRUB SERPL-MCNC: NORMAL MG/DL
BLOOD URINE, POC: NORMAL
COLOR, POC UA: YELLOW
GLUCOSE UR QL STRIP: NORMAL
KETONES UR QL STRIP: NORMAL
LEUKOCYTE ESTERASE URINE, POC: POSITIVE
NITRITE, POC UA: POSITIVE
PH, POC UA: 5
PROTEIN, POC: NORMAL
SPECIFIC GRAVITY, POC UA: 1.01
UROBILINOGEN, POC UA: NORMAL

## 2020-03-13 PROCEDURE — 1101F PR PT FALLS ASSESS DOC 0-1 FALLS W/OUT INJ PAST YR: ICD-10-PCS | Mod: HCNC,CPTII,S$GLB, | Performed by: PHYSICIAN ASSISTANT

## 2020-03-13 PROCEDURE — 99499 UNLISTED E&M SERVICE: CPT | Mod: S$GLB,,, | Performed by: PHYSICIAN ASSISTANT

## 2020-03-13 PROCEDURE — 1159F PR MEDICATION LIST DOCUMENTED IN MEDICAL RECORD: ICD-10-PCS | Mod: HCNC,S$GLB,, | Performed by: PHYSICIAN ASSISTANT

## 2020-03-13 PROCEDURE — 99214 OFFICE O/P EST MOD 30 MIN: CPT | Mod: 25,HCNC,S$GLB, | Performed by: PHYSICIAN ASSISTANT

## 2020-03-13 PROCEDURE — 81002 POCT URINE DIPSTICK WITHOUT MICROSCOPE: ICD-10-PCS | Mod: HCNC,S$GLB,, | Performed by: PHYSICIAN ASSISTANT

## 2020-03-13 PROCEDURE — 1126F PR PAIN SEVERITY QUANTIFIED, NO PAIN PRESENT: ICD-10-PCS | Mod: HCNC,S$GLB,, | Performed by: PHYSICIAN ASSISTANT

## 2020-03-13 PROCEDURE — 1159F MED LIST DOCD IN RCRD: CPT | Mod: HCNC,S$GLB,, | Performed by: PHYSICIAN ASSISTANT

## 2020-03-13 PROCEDURE — 99999 PR PBB SHADOW E&M-EST. PATIENT-LVL IV: CPT | Mod: PBBFAC,HCNC,, | Performed by: PHYSICIAN ASSISTANT

## 2020-03-13 PROCEDURE — 99999 PR PBB SHADOW E&M-EST. PATIENT-LVL IV: ICD-10-PCS | Mod: PBBFAC,HCNC,, | Performed by: PHYSICIAN ASSISTANT

## 2020-03-13 PROCEDURE — 81002 URINALYSIS NONAUTO W/O SCOPE: CPT | Mod: HCNC,S$GLB,, | Performed by: PHYSICIAN ASSISTANT

## 2020-03-13 PROCEDURE — 99499 RISK ADDL DX/OHS AUDIT: ICD-10-PCS | Mod: S$GLB,,, | Performed by: PHYSICIAN ASSISTANT

## 2020-03-13 PROCEDURE — 1101F PT FALLS ASSESS-DOCD LE1/YR: CPT | Mod: HCNC,CPTII,S$GLB, | Performed by: PHYSICIAN ASSISTANT

## 2020-03-13 PROCEDURE — 1126F AMNT PAIN NOTED NONE PRSNT: CPT | Mod: HCNC,S$GLB,, | Performed by: PHYSICIAN ASSISTANT

## 2020-03-13 PROCEDURE — 99214 PR OFFICE/OUTPT VISIT, EST, LEVL IV, 30-39 MIN: ICD-10-PCS | Mod: 25,HCNC,S$GLB, | Performed by: PHYSICIAN ASSISTANT

## 2020-03-13 RX ORDER — ESCITALOPRAM OXALATE 5 MG/1
5 TABLET ORAL DAILY
Qty: 30 TABLET | Refills: 3 | Status: SHIPPED | OUTPATIENT
Start: 2020-03-13 | End: 2021-03-13

## 2020-03-13 RX ORDER — NITROFURANTOIN 25; 75 MG/1; MG/1
100 CAPSULE ORAL 2 TIMES DAILY
Qty: 10 CAPSULE | Refills: 0 | Status: SHIPPED | OUTPATIENT
Start: 2020-03-13 | End: 2020-03-18

## 2020-03-13 NOTE — TELEPHONE ENCOUNTER
----- Message from Dinh Leslie sent at 3/12/2020  6:59 PM CDT -----  Contact: Pt   Type: Needs Medical Advice    Who Called: Patient's Pat Jay (Daughter).  Sysptoms (please be specific): bladder infection UTI going to the bathroom alot  How long has patient had these symptoms: n/a  [facility] and [id number] [place], [place] - [address]  [address] [facility] [phone] (Phone   Would the patient rather a call back or a response via [facility]? Call back  Best Call Back Number : [phone]  Additional Information: n/a    Thank You.

## 2020-03-13 NOTE — PROGRESS NOTES
Subjective:       Patient ID: Soren Gasca is a 86 y.o. female.    Chief Complaint: Urinary Tract Infection (frequent urine and more confusion)    HPI   Patient comes in today for evaluation of urine     Caregivers have noticed freq urination and confusion lately   They are not quite sure what is going on, Patient also seems more depressed, getting upset when left alone.  granddaughter does live with her but has to go to work, etc.   Patient has not been falling, no other issues.     Has known dementia, early stage       Health Maintenance Due   Topic Date Due    TETANUS VACCINE  02/18/2020       Past Medical History:   Diagnosis Date    Acid reflux     Arthritis     Dizziness     H/O splenectomy     for ITP    Hearing loss     History of bleeding ulcers     Hypertension     Leg swelling     Macular degeneration     Memory loss     Thrombocytopenia        Current Outpatient Medications   Medication Sig Dispense Refill    acetaminophen (TYLENOL ARTHRITIS PAIN) 650 MG TbSR Take 650 mg by mouth every 8 (eight) hours.      lidocaine-menthol 4-1 % PtMd To apply to back as patch daily for lower back pain 15 patch 11    meclizine (ANTIVERT) 25 mg tablet Take 1 tablet (25 mg total) by mouth every morning. For vertigo and nausea 90 tablet 3    metoprolol succinate (TOPROL-XL) 25 MG 24 hr tablet Take 0.5-1 tablets (12.5-25 mg total) by mouth every morning. 90 tablet 3    multivitamin-iron-folic acid Tab 1 tablet po am with iron, b12, calcium, vitamin D      pantoprazole (PROTONIX) 40 MG tablet Take 1 tablet (40 mg total) by mouth every morning. 90 tablet 3    escitalopram oxalate (LEXAPRO) 5 MG Tab Take 1 tablet (5 mg total) by mouth once daily. 30 tablet 3    mupirocin (BACTROBAN) 2 % ointment Apply topically 3 (three) times daily. To nose 30 g 3    nitrofurantoin, macrocrystal-monohydrate, (MACROBID) 100 MG capsule Take 1 capsule (100 mg total) by mouth 2 (two) times daily. for 5 days 10 capsule 0     "triamcinolone acetonide 0.1% (KENALOG) 0.1 % cream APPLY TOPICALLY 2 (TWO) TIMES DAILY. 80 g 0     No current facility-administered medications for this visit.        Review of Systems   Constitutional: Negative for activity change.   Eyes: Negative for discharge.   Respiratory: Negative for wheezing.    Cardiovascular: Negative for chest pain and palpitations.   Gastrointestinal: Negative for constipation, diarrhea and vomiting.   Endocrine: Positive for polyuria.   Genitourinary: Negative for difficulty urinating and hematuria.   Neurological: Negative for headaches.   Psychiatric/Behavioral: Positive for confusion and dysphoric mood.       Objective:   BP (!) 150/80   Pulse 72   Temp 98.5 °F (36.9 °C)   Ht 5' 3" (1.6 m)   Wt 59.2 kg (130 lb 8.2 oz)   SpO2 97%   BMI 23.12 kg/m²      Physical Exam   Constitutional: She is oriented to person, place, and time. She appears well-developed and well-nourished. No distress.   HENT:   Head: Normocephalic and atraumatic.   Right Ear: External ear normal.   Left Ear: External ear normal.   Nose: Nose normal.   Mouth/Throat: Oropharynx is clear and moist. No oropharyngeal exudate.   Eyes: Pupils are equal, round, and reactive to light. EOM are normal.   Neck: Normal range of motion. Neck supple.   Cardiovascular: Normal rate, regular rhythm, normal heart sounds and intact distal pulses.   Pulmonary/Chest: Effort normal and breath sounds normal.   Abdominal: Soft. There is no tenderness.   Musculoskeletal: She exhibits no edema.   Neurological: She is alert and oriented to person, place, and time.   Skin: Capillary refill takes less than 2 seconds.   Psychiatric: She has a normal mood and affect. Her behavior is normal.         Lab Results   Component Value Date    WBC 8.77 01/20/2020    HGB 12.2 01/20/2020    HCT 39.1 01/20/2020     01/20/2020    CHOL 229 (H) 01/20/2020    TRIG 144 01/20/2020    HDL 67 01/20/2020    ALT 22 01/20/2020    AST 35 01/20/2020    NA " 142 01/20/2020    K 4.2 01/20/2020     01/20/2020    CREATININE 0.9 01/20/2020    BUN 32 (H) 01/20/2020    CO2 27 01/20/2020    TSH 1.227 01/20/2020    INR 1.0 12/03/2009    HGBA1C 5.7 (H) 01/20/2020       Assessment:       1. Confusion    2. Late onset Alzheimer's disease without behavioral disturbance        Plan:   Confusion  -     POCT URINE DIPSTICK WITHOUT MICROSCOPE    Late onset Alzheimer's disease without behavioral disturbance    Mild depression    -     escitalopram oxalate (LEXAPRO) 5 MG Tab; Take 1 tablet (5 mg total) by mouth once daily.  Dispense: 30 tablet; Refill: 3      Discussed that UTI or other infections can make confusion worse in the elderly population     Given her new depression, want to start on low dose lexapro and follow up

## 2020-03-18 ENCOUNTER — PATIENT MESSAGE (OUTPATIENT)
Dept: INTERNAL MEDICINE | Facility: CLINIC | Age: 85
End: 2020-03-18

## 2020-03-24 ENCOUNTER — TELEPHONE (OUTPATIENT)
Dept: INTERNAL MEDICINE | Facility: CLINIC | Age: 85
End: 2020-03-24

## 2020-03-24 ENCOUNTER — PATIENT MESSAGE (OUTPATIENT)
Dept: INTERNAL MEDICINE | Facility: CLINIC | Age: 85
End: 2020-03-24

## 2020-03-24 NOTE — TELEPHONE ENCOUNTER
----- Message from Ashley Soto sent at 3/24/2020  9:22 AM CDT -----  Contact: Pat-daughter   Type:  Patient Returning Call    Who Called:Pat   Who Left Message for Patient:nurse  Does the patient know what this is regarding?:appt  Would the patient rather a call back or a response via [facility]? Call back  Best Call Back Number:[phone]  Additional Information:

## 2020-03-24 NOTE — TELEPHONE ENCOUNTER
Spoke with pt's daughter regarding pt's appt. Appt has been rescheduled for a date and time that will work for them. Pt's daughter stated that pt is doing well on lexapro.

## 2020-05-25 ENCOUNTER — PATIENT MESSAGE (OUTPATIENT)
Dept: INTERNAL MEDICINE | Facility: CLINIC | Age: 85
End: 2020-05-25

## 2020-05-27 ENCOUNTER — OFFICE VISIT (OUTPATIENT)
Dept: INTERNAL MEDICINE | Facility: CLINIC | Age: 85
End: 2020-05-27
Payer: MEDICARE

## 2020-05-27 ENCOUNTER — HOSPITAL ENCOUNTER (OUTPATIENT)
Dept: RADIOLOGY | Facility: HOSPITAL | Age: 85
Discharge: HOME | End: 2020-05-27
Attending: PHYSICIAN ASSISTANT
Payer: MEDICARE

## 2020-05-27 VITALS
WEIGHT: 129.19 LBS | HEIGHT: 63 IN | HEART RATE: 68 BPM | BODY MASS INDEX: 22.89 KG/M2 | SYSTOLIC BLOOD PRESSURE: 158 MMHG | TEMPERATURE: 98 F | DIASTOLIC BLOOD PRESSURE: 72 MMHG

## 2020-05-27 DIAGNOSIS — Z02.2 ENCOUNTER FOR EXAMINATION FOR ADMISSION TO NURSING HOME: Primary | ICD-10-CM

## 2020-05-27 DIAGNOSIS — I10 ESSENTIAL HYPERTENSION: ICD-10-CM

## 2020-05-27 DIAGNOSIS — Z02.2 ENCOUNTER FOR EXAMINATION FOR ADMISSION TO NURSING HOME: ICD-10-CM

## 2020-05-27 PROCEDURE — 71046 X-RAY EXAM CHEST 2 VIEWS: CPT | Mod: TC,HCNC,FY,PO

## 2020-05-27 PROCEDURE — 71046 XR CHEST PA AND LATERAL: ICD-10-PCS | Mod: 26,HCNC,, | Performed by: RADIOLOGY

## 2020-05-27 PROCEDURE — 99999 PR PBB SHADOW E&M-EST. PATIENT-LVL III: ICD-10-PCS | Mod: PBBFAC,HCNC,, | Performed by: PHYSICIAN ASSISTANT

## 2020-05-27 PROCEDURE — 99213 OFFICE O/P EST LOW 20 MIN: CPT | Mod: HCNC,S$GLB,, | Performed by: PHYSICIAN ASSISTANT

## 2020-05-27 PROCEDURE — 71046 X-RAY EXAM CHEST 2 VIEWS: CPT | Mod: 26,HCNC,, | Performed by: RADIOLOGY

## 2020-05-27 PROCEDURE — 1159F PR MEDICATION LIST DOCUMENTED IN MEDICAL RECORD: ICD-10-PCS | Mod: HCNC,S$GLB,, | Performed by: PHYSICIAN ASSISTANT

## 2020-05-27 PROCEDURE — 1126F PR PAIN SEVERITY QUANTIFIED, NO PAIN PRESENT: ICD-10-PCS | Mod: HCNC,S$GLB,, | Performed by: PHYSICIAN ASSISTANT

## 2020-05-27 PROCEDURE — 1101F PR PT FALLS ASSESS DOC 0-1 FALLS W/OUT INJ PAST YR: ICD-10-PCS | Mod: HCNC,CPTII,S$GLB, | Performed by: PHYSICIAN ASSISTANT

## 2020-05-27 PROCEDURE — 99213 PR OFFICE/OUTPT VISIT, EST, LEVL III, 20-29 MIN: ICD-10-PCS | Mod: HCNC,S$GLB,, | Performed by: PHYSICIAN ASSISTANT

## 2020-05-27 PROCEDURE — 1159F MED LIST DOCD IN RCRD: CPT | Mod: HCNC,S$GLB,, | Performed by: PHYSICIAN ASSISTANT

## 2020-05-27 PROCEDURE — 1101F PT FALLS ASSESS-DOCD LE1/YR: CPT | Mod: HCNC,CPTII,S$GLB, | Performed by: PHYSICIAN ASSISTANT

## 2020-05-27 PROCEDURE — 99499 RISK ADDL DX/OHS AUDIT: ICD-10-PCS | Mod: HCNC,S$GLB,, | Performed by: PHYSICIAN ASSISTANT

## 2020-05-27 PROCEDURE — 1126F AMNT PAIN NOTED NONE PRSNT: CPT | Mod: HCNC,S$GLB,, | Performed by: PHYSICIAN ASSISTANT

## 2020-05-27 PROCEDURE — 99499 UNLISTED E&M SERVICE: CPT | Mod: HCNC,S$GLB,, | Performed by: PHYSICIAN ASSISTANT

## 2020-05-27 PROCEDURE — 99999 PR PBB SHADOW E&M-EST. PATIENT-LVL III: CPT | Mod: PBBFAC,HCNC,, | Performed by: PHYSICIAN ASSISTANT

## 2020-05-28 NOTE — PROGRESS NOTES
Subjective:       Patient ID: Soren Gasca is a 86 y.o. female.    Chief Complaint: Follow-up    HPI     Patient comes in today for Chest X ray    She is going to a nursing home soon and they need a Chest X ray or tb test    Patient has no known history of TB   No recent illnesses or hospitalizations  She does have dementia without any behavioral issues    Family member is with her today      Health Maintenance Due   Topic Date Due    TETANUS VACCINE  02/18/2020       Past Medical History:   Diagnosis Date    Acid reflux     Arthritis     Dizziness     H/O splenectomy     for ITP    Hearing loss     History of bleeding ulcers     Hypertension     Leg swelling     Macular degeneration     Memory loss     Thrombocytopenia        Current Outpatient Medications   Medication Sig Dispense Refill    escitalopram oxalate (LEXAPRO) 5 MG Tab Take 1 tablet (5 mg total) by mouth once daily. 30 tablet 3    meclizine (ANTIVERT) 25 mg tablet Take 1 tablet (25 mg total) by mouth every morning. For vertigo and nausea 90 tablet 3    metoprolol succinate (TOPROL-XL) 25 MG 24 hr tablet Take 0.5-1 tablets (12.5-25 mg total) by mouth every morning. 90 tablet 3    multivitamin-iron-folic acid Tab 1 tablet po am with iron, b12, calcium, vitamin D      pantoprazole (PROTONIX) 40 MG tablet Take 1 tablet (40 mg total) by mouth every morning. 90 tablet 3     No current facility-administered medications for this visit.        Review of Systems   Constitutional: Negative for activity change and unexpected weight change.   HENT: Positive for hearing loss. Negative for rhinorrhea and trouble swallowing.    Eyes: Negative for discharge and visual disturbance.   Respiratory: Negative for chest tightness and wheezing.    Cardiovascular: Negative for chest pain and palpitations.   Gastrointestinal: Negative for blood in stool, constipation, diarrhea and vomiting.   Endocrine: Negative for polydipsia and polyuria.   Genitourinary:  "Negative for difficulty urinating, dysuria, hematuria and menstrual problem.   Musculoskeletal: Negative for arthralgias, joint swelling and neck pain.   Neurological: Negative for weakness and headaches.   Psychiatric/Behavioral: Positive for confusion. Negative for dysphoric mood.       Objective:   BP (!) 158/72   Pulse 68   Temp 97.5 °F (36.4 °C) (Temporal)   Ht 5' 3" (1.6 m)   Wt 58.6 kg (129 lb 3 oz)   BMI 22.88 kg/m²      Physical Exam   Constitutional: She is oriented to person, place, and time. She appears well-developed and well-nourished. No distress.   HENT:   Head: Normocephalic and atraumatic.   Eyes: Pupils are equal, round, and reactive to light. EOM are normal.   Neck: Normal range of motion. Neck supple.   Cardiovascular: Normal rate and regular rhythm.   Pulmonary/Chest: Effort normal and breath sounds normal.   Abdominal: Soft.   Musculoskeletal: She exhibits no edema.   Neurological: She is alert and oriented to person, place, and time.   Skin: Capillary refill takes less than 2 seconds.   Psychiatric: She has a normal mood and affect. Her behavior is normal.         Lab Results   Component Value Date    WBC 8.77 01/20/2020    HGB 12.2 01/20/2020    HCT 39.1 01/20/2020     01/20/2020    CHOL 229 (H) 01/20/2020    TRIG 144 01/20/2020    HDL 67 01/20/2020    ALT 22 01/20/2020    AST 35 01/20/2020     01/20/2020    K 4.2 01/20/2020     01/20/2020    CREATININE 0.9 01/20/2020    BUN 32 (H) 01/20/2020    CO2 27 01/20/2020    TSH 1.227 01/20/2020    INR 1.0 12/03/2009    HGBA1C 5.7 (H) 01/20/2020       Assessment:       1. Encounter for examination for admission to nursing home    2. Essential hypertension        Plan:   Encounter for examination for admission to nursing home  -     X-Ray Chest PA And Lateral; Future; Expected date: 05/27/2020    Essential hypertension      A little elevated today   Check in home or at admission and follow up if  Continues to be high   Chest X " ray today   No follow-ups on file.

## 2020-05-29 ENCOUNTER — PATIENT MESSAGE (OUTPATIENT)
Dept: INTERNAL MEDICINE | Facility: CLINIC | Age: 85
End: 2020-05-29

## 2020-08-05 ENCOUNTER — TELEPHONE (OUTPATIENT)
Dept: PRIMARY CARE CLINIC | Facility: CLINIC | Age: 85
End: 2020-08-05

## 2020-08-05 DIAGNOSIS — F03.90 DEMENTIA WITHOUT BEHAVIORAL DISTURBANCE, UNSPECIFIED DEMENTIA TYPE: ICD-10-CM

## 2020-08-05 DIAGNOSIS — R41.89 COGNITIVE DECLINE: Primary | ICD-10-CM

## 2020-08-05 DIAGNOSIS — D69.6 THROMBOCYTOPENIA: Chronic | ICD-10-CM

## 2020-08-05 NOTE — TELEPHONE ENCOUNTER
pts daughter sent through [employer] on pt.        Dr. Jones, Mom, Soren Gasca  9/5/1933, is now in [facility] as of June 2020.  She has to have 24 hour care and has went downhill fast these past 6 months.  They had mentioned to me that even though mom isn't currently near death, she could benefit being on Hospice so after much thought I am starting the process of getting that service for her.  I understand that we need an order from her Primary Care to get this set up and we would like to use [facility].  Thank you for all you have done for Mom through the years.

## 2020-12-07 RX ORDER — MECLIZINE HYDROCHLORIDE 25 MG/1
TABLET ORAL
Qty: 30 TABLET | Refills: 3 | Status: SHIPPED | OUTPATIENT
Start: 2020-12-07 | End: 2021-04-06

## 2020-12-07 RX ORDER — METOPROLOL SUCCINATE 25 MG/1
TABLET, EXTENDED RELEASE ORAL
Qty: 30 TABLET | Refills: 3 | Status: SHIPPED | OUTPATIENT
Start: 2020-12-07 | End: 2021-04-06

## 2021-01-05 RX ORDER — PANTOPRAZOLE SODIUM 20 MG/1
TABLET, DELAYED RELEASE ORAL
Qty: 28 TABLET | Refills: 0 | Status: SHIPPED | OUTPATIENT
Start: 2021-01-05 | End: 2021-02-08

## 2021-01-06 ENCOUNTER — TELEPHONE (OUTPATIENT)
Dept: PRIMARY CARE CLINIC | Facility: CLINIC | Age: 86
End: 2021-01-06

## 2021-04-22 ENCOUNTER — PES CALL (OUTPATIENT)
Dept: ADMINISTRATIVE | Facility: CLINIC | Age: 86
End: 2021-04-22

## 2021-06-03 RX ORDER — PANTOPRAZOLE SODIUM 20 MG/1
TABLET, DELAYED RELEASE ORAL
Qty: 28 TABLET | Refills: 0 | OUTPATIENT
Start: 2021-06-03

## 2021-06-03 RX ORDER — MECLIZINE HYDROCHLORIDE 25 MG/1
TABLET ORAL
Qty: 28 TABLET | Refills: 0 | OUTPATIENT
Start: 2021-06-03

## 2021-06-03 RX ORDER — METOPROLOL SUCCINATE 25 MG/1
TABLET, EXTENDED RELEASE ORAL
Qty: 28 TABLET | Refills: 0 | OUTPATIENT
Start: 2021-06-03

## 2021-06-17 ENCOUNTER — PES CALL (OUTPATIENT)
Dept: ADMINISTRATIVE | Facility: CLINIC | Age: 86
End: 2021-06-17

## 2021-07-18 ENCOUNTER — PATIENT MESSAGE (OUTPATIENT)
Dept: ADMINISTRATIVE | Facility: HOSPITAL | Age: 86
End: 2021-07-18

## 2021-08-04 ENCOUNTER — PES CALL (OUTPATIENT)
Dept: ADMINISTRATIVE | Facility: CLINIC | Age: 86
End: 2021-08-04

## 2021-09-14 ENCOUNTER — PES CALL (OUTPATIENT)
Dept: ADMINISTRATIVE | Facility: CLINIC | Age: 86
End: 2021-09-14

## 2023-04-06 ENCOUNTER — TELEPHONE (OUTPATIENT)
Dept: PRIMARY CARE CLINIC | Facility: CLINIC | Age: 88
End: 2023-04-06
Payer: MEDICARE

## 2023-04-06 NOTE — TELEPHONE ENCOUNTER
----- Message from Divya Samson sent at 4/6/2023 12:19 PM CDT -----  Contact: Hospice\Alesha  Shannon called in to request the clinic notes/order for medication to treat eye and buttocks boil. Please email to [email or web address]. Please call her back at [phone].    Thanks  TS

## 2023-04-06 NOTE — TELEPHONE ENCOUNTER
----- Message from Sheron Vazquez sent at 4/6/2023  1:58 PM CDT -----  Contact: Alesha  .Type:  Patient Returning Call    Who Called:Alesha  Who Left Message for Patient:Belle   Does the patient know what this is regarding?:yes  Would the patient rather a call back or a response via [facility]? Phone call  Best Call Back Number: [phone]  Additional Information:

## 2023-04-06 NOTE — TELEPHONE ENCOUNTER
Returned Shannon call regarding Mrs. Gasca. Told her she would have to contact  medical records for office notes.